# Patient Record
Sex: FEMALE | Race: ASIAN | NOT HISPANIC OR LATINO | Employment: OTHER | URBAN - METROPOLITAN AREA
[De-identification: names, ages, dates, MRNs, and addresses within clinical notes are randomized per-mention and may not be internally consistent; named-entity substitution may affect disease eponyms.]

---

## 2019-05-15 ENCOUNTER — CONVERSION ENCOUNTER (OUTPATIENT)
Dept: GASTROENTEROLOGY | Facility: CLINIC | Age: 54
End: 2019-05-15
Attending: INTERNAL MEDICINE

## 2019-06-12 ENCOUNTER — HOSPITAL ENCOUNTER (OUTPATIENT)
Dept: GASTROENTEROLOGY | Facility: HOSPITAL | Age: 54
Setting detail: HOSPITAL OUTPATIENT SURGERY
Discharge: HOME | End: 2019-06-12
Attending: INTERNAL MEDICINE

## 2020-02-05 ENCOUNTER — OFFICE VISIT CONVERTED (OUTPATIENT)
Dept: ORTHOPEDIC SURGERY | Facility: CLINIC | Age: 55
End: 2020-02-05
Attending: ORTHOPAEDIC SURGERY

## 2020-02-20 ENCOUNTER — OFFICE VISIT CONVERTED (OUTPATIENT)
Dept: GASTROENTEROLOGY | Facility: CLINIC | Age: 55
End: 2020-02-20
Attending: NURSE PRACTITIONER

## 2020-03-04 ENCOUNTER — HOSPITAL ENCOUNTER (OUTPATIENT)
Dept: GASTROENTEROLOGY | Facility: HOSPITAL | Age: 55
Setting detail: HOSPITAL OUTPATIENT SURGERY
Discharge: HOME | End: 2020-03-04
Attending: INTERNAL MEDICINE

## 2020-08-26 ENCOUNTER — OFFICE VISIT CONVERTED (OUTPATIENT)
Dept: ORTHOPEDIC SURGERY | Facility: CLINIC | Age: 55
End: 2020-08-26
Attending: ORTHOPAEDIC SURGERY

## 2021-01-15 ENCOUNTER — CONVERSION ENCOUNTER (OUTPATIENT)
Dept: ORTHOPEDIC SURGERY | Facility: CLINIC | Age: 56
End: 2021-01-15

## 2021-01-15 ENCOUNTER — OFFICE VISIT CONVERTED (OUTPATIENT)
Dept: ORTHOPEDIC SURGERY | Facility: CLINIC | Age: 56
End: 2021-01-15
Attending: ORTHOPAEDIC SURGERY

## 2021-02-08 ENCOUNTER — OFFICE VISIT CONVERTED (OUTPATIENT)
Dept: ORTHOPEDIC SURGERY | Facility: CLINIC | Age: 56
End: 2021-02-08
Attending: ORTHOPAEDIC SURGERY

## 2021-05-10 NOTE — H&P
History and Physical      Patient Name: Kahlil Elliott   Patient ID: [id number]   Sex: Female   YOB: 1965    Primary Care Provider: Nasrin HAAS   Referring Provider: Nasrin HAAS    Visit Date: February 8, 2021    Provider: Davis Lowe MD   Location: [facility]   Location Address: [address] [place] [address]  [id number]   Location Phone: [phone]          Chief Complaint  · Left shoulder pain       History Of Present Illness  Kahlil Elliott is a 55 year old female who presents today to [facility].      Patient presents today for an evaluation of left shoulder pain. Patient states she has been having shoulder pain for over a year. She states pain starts on her shoulder and radiates down her upper arm. She denies having any treatments for her left shoulder. She has some days where her shoulder pain is worse. Today her shoulder pain is not as bad. She denies any trauma or injury to her left shoulder.       Past Medical History  Arthritis; High blood pressure; Hypertension; Seasonal allergies         Past Surgical History  Back; Back surgery; breast reduction; Bunionectomy; Colonoscopy         Medication List  diclofenac sodium 75 mg oral tablet,delayed release (DR/EC); Gentak 0.3 % (3 mg/gram) ophthalmic (eye) ointment; Miralax 17 gram/dose oral powder; Norvasc 2.5 mg oral tablet; Vitamin D2 50,000 unit oral capsule; Zyrtec 10 mg oral capsule         Allergy List  NO KNOWN DRUG ALLERGIES       Allergies Reconciled  Family Medical History  No family history of colorectal cancer; Family history of Arthritis         Social History  Alcohol (Current some day); Alcohol Use (Current some day); lives with spouse; .; Recreational Drug Use (Never); Tobacco (Never); Unemployed.         Review of Systems  · Constitutional  o Denies  o : fever, chills, weight loss  · Cardiovascular  o Denies  o : chest pain, shortness of  "breath  · Gastrointestinal  o Denies  o : liver disease, heartburn, nausea, blood in stools  · Genitourinary  o Denies  o : painful urination, blood in urine  · Integument  o Denies  o : rash, itching  · Neurologic  o Denies  o : headache, weakness, loss of consciousness  · Musculoskeletal  o Denies  o : painful, swollen joints  · Psychiatric  o Denies  o : drug/alcohol addiction, anxiety, depression      Vitals  Date Time BP Position Site L\R Cuff Size HR RR TEMP (F) WT  HT  BMI kg/m2 BSA m2 O2 Sat FR L/min FiO2 HC       02/08/2021 09:43 AM      74 - R   151lbs 2oz 5'  4\" 25.94 1.76 98 %            Physical Examination  · Constitutional  o Appearance  o : well developed, well-nourished, no obvious deformities present  · Head and Face  o Head  o :   § Inspection  § : normocephalic  o Face  o :   § Inspection  § : no facial lesions  · Eyes  o Conjunctivae  o : conjunctivae normal  o Sclerae  o : sclerae white  · Ears, Nose, Mouth and Throat  o Ears  o :   § External Ears  § : appearance within normal limits  § Hearing  § : intact  o Nose  o :   § External Nose  § : appearance normal  · Neck  o Inspection/Palpation  o : normal appearance  o Range of Motion  o : full range of motion  · Respiratory  o Respiratory Effort  o : breathing unlabored  o Inspection of Chest  o : normal appearance  o Auscultation of Lungs  o : no audible wheezing or rales  · Cardiovascular  o Heart  o : regular rate  · Gastrointestinal  o Abdominal Examination  o : soft and non-tender  · Skin and Subcutaneous Tissue  o General Inspection  o : intact, no rashes  · Psychiatric  o General  o : Alert and oriented x3  o Judgement and Insight  o : judgment and insight intact  o Mood and Affect  o : mood normal, affect appropriate  · Left Shoulder  o Inspection  o : Sensation grossly intact. Neurovascular intact. Skin intact. Pulses are pleasant. Good tone of deltoid, biceps, triceps, wrist extensors, and wrist flexors. Tender AC joint. Tender biceps " tendon. Full forward flexion. Mild pain with empty can testing. 4 out of 5 RTC strength. Full cross body adduction. Full abduction. Full IR and ER. No pain with Shah. No pain with Neer's. Pain with drop arm testing.   · Injection Note/Aspiration Note  o Site  o : left shoulder  o Procedure  o : Procedure: After educating the patient, patient gave consent for procedure. After using Chloraprep, the joint space was injected. The patient tolerated the procedure well.  o Medication  o : 80 mg of DepoMedrol with 9cc of 1% Lidocaine  · In Office Procedures  o View  o : AP/LATERAL  o Site  o : left, shoulder  o Indication  o : Left shoulder pain   o Study  o : X-rays ordered, taken in the office, and reviewed today.  o Xray  o : No significant degenerative changes of the left shoulder. No fracture or dislocation.   o Comparative Data  o : No comparative data found          Assessment  · Left shoulder pain, unspecified chronicity     719.41/M25.512  · Left Shoulder Rotator Cuff Tendinitis     726.90/M77.9      Plan  · Orders  o Depo-Medrol injection 80mg () - - 02/08/2021   Lot [id number] Exp 01 2022 [employer] Administered by RASHEED DAVID MD   o Shoulder Intra-articular Injection without US Guidance [facility] (87896) - - 02/08/2021   Lot [id number] Exp 03 01 2022 [employer] Administered by RASHEED DAVID MD   o Scapula (Left) [facility] Preferred View (60226-ZL) - 719.41/M25.512 - 02/08/2021  · Medications  o Medications have been Reconciled  o Transition of Care or Provider Policy  · Instructions  o Dr. David saw and examined the patient and agrees with plan.   o X-rays reviewed by Dr. David.  o Reviewed the patient's Past Medical, Social, and Family history as well as the ROS at today's visit, no changes.  o Call or return if worsening symptoms.  o Follow up in May.   o This note was transcribed by Freya Pollack. daquan  o Discussed diagnosis and treatment options with the patient. Patient opted for an injection and tolerated it well. She is  visiting her daughter in [place] and won't be back until May. If pain doesn't improve or worsens she will consider an MRI when she returns.             Electronically Signed by: Freya Pollack-, Other -Author on February 8, 2021 11:08:37 AM  Electronically Co-signed by: Davis Lowe MD -Reviewer on February 15, 2021 04:55:12 PM

## 2021-05-13 NOTE — PROGRESS NOTES
Progress Note      Patient Name: Kahlil Elliott   Patient ID: [id number]   Sex: Female   YOB: 1965    Primary Care Provider: Nasrin HAAS   Referring Provider: Nasrin HAAS    Visit Date: August 26, 2020    Provider: Davis Lowe MD   Location: [facility]   Location Address: [address] [place] [address]  [id number]   Location Phone: [phone]          Chief Complaint  · Bilateral Elbow Pain      History Of Present Illness  Kahlil Elliott is a 55 year old female who presents today to [facility]. She is here for bilateral epicondylitis, lateral epicondylitis. She is requesting injections.       Past Medical History  Arthritis; High blood pressure         Past Surgical History  Back surgery; breast reduction; Bunionectomy; Colonoscopy         Medication List  diclofenac sodium 75 mg oral tablet,delayed release (DR/EC); Gentak 0.3 % (3 mg/gram) ophthalmic (eye) ointment; Miralax 17 gram/dose oral powder; Norvasc 2.5 mg oral tablet; Vitamin D2 50,000 unit oral capsule; Zyrtec 10 mg oral capsule         Allergy List  NO KNOWN DRUG ALLERGIES         Family Medical History  No family history of colorectal cancer; Family history of Arthritis         Social History  Alcohol (Current some day); lives with spouse; .; Recreational Drug Use (Never); Tobacco (Never); Unemployed.         Review of Systems  · Constitutional  o Denies  o : fever, chills, weight loss  · Cardiovascular  o Denies  o : chest pain, shortness of breath  · Gastrointestinal  o Admits  o : heartburn  o Denies  o : liver disease, nausea, blood in stools  · Genitourinary  o Denies  o : painful urination, blood in urine  · Integument  o Admits  o : itching  o Denies  o : rash  · Neurologic  o Denies  o : headache, weakness, loss of consciousness  · Musculoskeletal  o Admits  o : painful, swollen joints  · Psychiatric  o Denies  o : drug/alcohol addiction, anxiety, depression      Vitals  Date  "Time BP Position Site L\R Cuff Size HR RR TEMP (F) WT  HT  BMI kg/m2 BSA m2 O2 Sat        08/26/2020 07:37 AM         138lbs 0oz 5'  4\" 23.69 1.68           Physical Examination  · Constitutional  o Appearance  o : well developed, well-nourished, no obvious deformities present  · Head and Face  o Head  o :   § Inspection  § : normocephalic  o Face  o :   § Inspection  § : no facial lesions  · Eyes  o Conjunctivae  o : conjunctivae normal  o Sclerae  o : sclerae white  · Ears, Nose, Mouth and Throat  o Ears  o :   § External Ears  § : appearance within normal limits  § Hearing  § : intact  o Nose  o :   § External Nose  § : appearance normal  · Neck  o Inspection/Palpation  o : normal appearance  o Range of Motion  o : full range of motion  · Respiratory  o Respiratory Effort  o : breathing unlabored  o Inspection of Chest  o : normal appearance  o Auscultation of Lungs  o : no audible wheezing or rales  · Cardiovascular  o Heart  o : regular rate  · Gastrointestinal  o Abdominal Examination  o : soft and non-tender  · Skin and Subcutaneous Tissue  o General Inspection  o : intact, no rashes  · Psychiatric  o General  o : Alert and oriented x3  o Judgement and Insight  o : judgment and insight intact  o Mood and Affect  o : mood normal, affect appropriate  · Extremities  o Extremities  o : BILATERAL ELBOW: Appearance of elbows are normal. No skin discoloration, atrophy, or swelling. Full elbow range of motion. Tender lateral elbow. Non-tender medial elbow. Pain with wrist extension. Good tone of deltoid, biceps, triceps, wrist extensors, and wrist flexors. Neurovascularly intact. Sensation grossly intact.  · Injection Note/Aspiration Note  o Site  o : bilateral elbows   o Procedure  o : Procedure: After educating the patient, patient gave consent for procedure. After using Chloraprep, the joint space was injected. The patient tolerated the procedure well.   o Medication  o : 80 mg of DepoMedrol with 1cc of 1% " Lidocaine          Assessment  · Pain of both elbows       Pain in right elbow     719.42/M25.521  Pain in left elbow     719.42/M25.522  · Bilateral epicondylitis, lateral     726.32/M77.10      Plan  · Orders  o 2 - Depo-Medrol injection 80mg () - - 08/26/2020   Lot [id number] Exp 07 2021 [employer] Administered by RASHEED Lowe MD  o 2 - Elbow-Lat Single Tendon (Injection) (64677) - - 08/26/2020   Lot [id number] Exp 08 01 2021 [employer] Administered by RASHEED Lowe MD  · Medications  o Medications have been Reconciled  o Transition of Care or Provider Policy  · Instructions  o Reviewed the patient's Past Medical, Social, and Family history as well as the ROS at today's visit, no changes.  o Call or return if worsening symptoms.  o This note is transcribed by Madeleine butt  o I injected both elbows and she tolerated it well            Electronically Signed by: Madeleine Kaplan, -Author on August 26, 2020 03:51:07 PM  Electronically Co-signed by: Davis Lowe MD -Reviewer on August 26, 2020 05:59:26 PM

## 2021-05-14 VITALS — OXYGEN SATURATION: 98 % | WEIGHT: 151.12 LBS | HEART RATE: 74 BPM | HEIGHT: 64 IN | BODY MASS INDEX: 25.8 KG/M2

## 2021-05-14 VITALS — HEART RATE: 76 BPM | BODY MASS INDEX: 25.44 KG/M2 | WEIGHT: 149 LBS | HEIGHT: 64 IN | OXYGEN SATURATION: 98 %

## 2021-05-14 VITALS — BODY MASS INDEX: 23.56 KG/M2 | HEIGHT: 64 IN | WEIGHT: 138 LBS

## 2021-05-14 NOTE — PROGRESS NOTES
Progress Note      Patient Name: Kahlil Elliott   Patient ID: [id number]   Sex: Female   YOB: 1965    Primary Care Provider: Nasrin HAAS   Referring Provider: Nasrin HAAS    Visit Date: January 15, 2021    Provider: Davis Lowe MD   Location: [facility]   Location Address: [address] [place] [address]  [id number]   Location Phone: [phone]          Chief Complaint  · Left Elbow Pain      History Of Present Illness  Kahlil Elliott is a 55 year old female who presents today to [facility].      Patient presents today with a follow-up of left elbow pain. Patient has a history of lateral epicondylitis of bilateral elbows that she has been treating conservatively with injections. Patient states injections do give her relief of pain and is requesting to receive another injection. She states that her right elbow pain has been doing well and has not been causing her any troubles.       Past Medical History  Arthritis; High blood pressure         Past Surgical History  Back surgery; breast reduction; Bunionectomy; Colonoscopy         Medication List  diclofenac sodium 75 mg oral tablet,delayed release (DR/EC); Gentak 0.3 % (3 mg/gram) ophthalmic (eye) ointment; Miralax 17 gram/dose oral powder; Norvasc 2.5 mg oral tablet; Vitamin D2 50,000 unit oral capsule; Zyrtec 10 mg oral capsule         Allergy List  NO KNOWN DRUG ALLERGIES       Allergies Reconciled  Family Medical History  No family history of colorectal cancer; Family history of Arthritis         Social History  Alcohol (Current some day); lives with spouse; .; Recreational Drug Use (Never); Tobacco (Never); Unemployed.         Review of Systems  · Constitutional  o Denies  o : fever, chills, weight loss  · Cardiovascular  o Denies  o : chest pain, shortness of breath  · Gastrointestinal  o Denies  o : liver disease, heartburn, nausea, blood in stools  · Genitourinary  o Denies  o : painful  "urination, blood in urine  · Integument  o Denies  o : rash, itching  · Neurologic  o Denies  o : headache, weakness, loss of consciousness  · Musculoskeletal  o Denies  o : painful, swollen joints  · Psychiatric  o Denies  o : drug/alcohol addiction, anxiety, depression      Vitals  Date Time BP Position Site L\R Cuff Size HR RR TEMP (F) WT  HT  BMI kg/m2 BSA m2 O2 Sat FR L/min FiO2        01/15/2021 03:14 PM      76 - R   149lbs 0oz 5'  4\" 25.58 1.75 98 %            Physical Examination  · Constitutional  o Appearance  o : well developed, well-nourished, no obvious deformities present  · Head and Face  o Head  o :   § Inspection  § : normocephalic  o Face  o :   § Inspection  § : no facial lesions  · Eyes  o Conjunctivae  o : conjunctivae normal  o Sclerae  o : sclerae white  · Ears, Nose, Mouth and Throat  o Ears  o :   § External Ears  § : appearance within normal limits  § Hearing  § : intact  o Nose  o :   § External Nose  § : appearance normal  · Neck  o Inspection/Palpation  o : normal appearance  o Range of Motion  o : full range of motion  · Respiratory  o Respiratory Effort  o : breathing unlabored  o Inspection of Chest  o : normal appearance  o Auscultation of Lungs  o : no audible wheezing or rales  · Cardiovascular  o Heart  o : regular rate  · Gastrointestinal  o Abdominal Examination  o : soft and non-tender  · Skin and Subcutaneous Tissue  o General Inspection  o : intact, no rashes  · Psychiatric  o General  o : Alert and oriented x3  o Judgement and Insight  o : judgment and insight intact  o Mood and Affect  o : mood normal, affect appropriate  · Left Elbow  o Inspection  o : Sensation grossly intact. Neurovascular intact. Skin intact. Tender lateral elbow. No swelling, skin discoloration or atrophy. Full flexion and extension of elbow. Pain with resisted wrist extension. Good tone of deltoid, biceps, triceps, wrist extensors, and wrist flexors.  · Injection Note/Aspiration Note  o Site  o : " left elbow  o Procedure  o : Procedure: After educating the patient, patient gave consent for procedure. After using Chloraprep, the joint space was injected. The patient tolerated the procedure well.  o Medication  o : 80 mg of DepoMedrol with 1cc of 1% Lidocaine          Assessment  · Left elbow pain     719.42/M25.522  · Lateral epicondylitis of left elbow     726.32/M77.12      Plan  · Orders  o Depo-Medrol injection 80mg () - - 01/15/2021   Lot [id number] Exo 01 2022 [employer] Administered by MARYANA DAVID MD  o Elbow-Lat Single Tendon (Injection) (41183) - - 01/15/2021   Lot [id number] Exp 03 01 2022 [employer] Administered by MARYANA DAVID MD  · Medications  o Medications have been Reconciled  o Transition of Care or Provider Policy  · Instructions  o Dr. David saw and examined the patient and agrees with plan.   o Reviewed the patient's Past Medical, Social, and Family history as well as the ROS at today's visit, no changes.  o Call or return if worsening symptoms.  o Exercise handout given.  o Follow Up in 3 weeks.  o This note was transcribed by Freya Pollack. daquan  o Discussed diagnosis and treatment options with the patient. Patient received a left elbow injection and tolerated this well. Patient given some at home exercises to work on for tennis elbow.            Electronically Signed by: Freya Pollack-, Other -Author on January 18, 2021 09:06:11 AM  Electronically Co-signed by: Maryana David MD -Reviewer on January 19, 2021 07:40:47 PM

## 2021-05-15 VITALS
BODY MASS INDEX: 23.62 KG/M2 | DIASTOLIC BLOOD PRESSURE: 82 MMHG | SYSTOLIC BLOOD PRESSURE: 118 MMHG | WEIGHT: 138.37 LBS | HEIGHT: 64 IN

## 2021-05-15 VITALS — OXYGEN SATURATION: 97 % | HEIGHT: 64 IN | WEIGHT: 137.37 LBS | HEART RATE: 74 BPM | BODY MASS INDEX: 23.45 KG/M2

## 2022-11-10 ENCOUNTER — OFFICE VISIT (OUTPATIENT)
Dept: INTERNAL MEDICINE | Facility: CLINIC | Age: 57
End: 2022-11-10

## 2022-11-10 VITALS
TEMPERATURE: 98.3 F | HEIGHT: 64 IN | BODY MASS INDEX: 24.96 KG/M2 | DIASTOLIC BLOOD PRESSURE: 86 MMHG | OXYGEN SATURATION: 98 % | HEART RATE: 74 BPM | SYSTOLIC BLOOD PRESSURE: 124 MMHG | WEIGHT: 146.2 LBS

## 2022-11-10 DIAGNOSIS — Z23 ENCOUNTER FOR IMMUNIZATION: ICD-10-CM

## 2022-11-10 DIAGNOSIS — R73.9 HYPERGLYCEMIA: ICD-10-CM

## 2022-11-10 DIAGNOSIS — Z11.59 NEED FOR HEPATITIS C SCREENING TEST: ICD-10-CM

## 2022-11-10 DIAGNOSIS — E78.5 HYPERLIPIDEMIA, UNSPECIFIED HYPERLIPIDEMIA TYPE: ICD-10-CM

## 2022-11-10 DIAGNOSIS — Z00.00 ANNUAL PHYSICAL EXAM: Primary | ICD-10-CM

## 2022-11-10 DIAGNOSIS — M19.90 ARTHRITIS: ICD-10-CM

## 2022-11-10 DIAGNOSIS — I10 ESSENTIAL HYPERTENSION: ICD-10-CM

## 2022-11-10 DIAGNOSIS — Z12.11 SCREEN FOR COLON CANCER: ICD-10-CM

## 2022-11-10 DIAGNOSIS — E66.3 OVERWEIGHT (BMI 25.0-29.9): ICD-10-CM

## 2022-11-10 DIAGNOSIS — Z76.89 ESTABLISHING CARE WITH NEW DOCTOR, ENCOUNTER FOR: ICD-10-CM

## 2022-11-10 DIAGNOSIS — Z85.3 HISTORY OF BREAST CANCER: ICD-10-CM

## 2022-11-10 PROBLEM — M25.522 ARTHRALGIA OF LEFT ELBOW: Status: ACTIVE | Noted: 2018-11-13

## 2022-11-10 PROBLEM — M25.519 SHOULDER JOINT PAIN: Status: ACTIVE | Noted: 2018-11-13

## 2022-11-10 PROBLEM — M51.36 DEGENERATION OF LUMBAR INTERVERTEBRAL DISC: Status: ACTIVE | Noted: 2018-11-13

## 2022-11-10 PROBLEM — M25.571 BILATERAL ANKLE JOINT PAIN: Status: ACTIVE | Noted: 2022-11-10

## 2022-11-10 PROBLEM — M25.572 BILATERAL ANKLE JOINT PAIN: Status: ACTIVE | Noted: 2022-11-10

## 2022-11-10 PROBLEM — G56.02 CARPAL TUNNEL SYNDROME OF LEFT WRIST: Status: ACTIVE | Noted: 2018-11-13

## 2022-11-10 PROBLEM — J30.2 SEASONAL ALLERGIC RHINITIS: Status: ACTIVE | Noted: 2022-11-10

## 2022-11-10 PROBLEM — M79.673 FOOT PAIN: Status: ACTIVE | Noted: 2018-11-13

## 2022-11-10 LAB
ALBUMIN SERPL-MCNC: 4.5 G/DL (ref 3.5–5.2)
ALBUMIN/GLOB SERPL: 1.4 G/DL
ALP SERPL-CCNC: 105 U/L (ref 39–117)
ALT SERPL W P-5'-P-CCNC: 12 U/L (ref 1–33)
ANION GAP SERPL CALCULATED.3IONS-SCNC: 11.1 MMOL/L (ref 5–15)
AST SERPL-CCNC: 18 U/L (ref 1–32)
BASOPHILS # BLD AUTO: 0.07 10*3/MM3 (ref 0–0.2)
BASOPHILS NFR BLD AUTO: 0.9 % (ref 0–1.5)
BILIRUB SERPL-MCNC: 0.3 MG/DL (ref 0–1.2)
BUN SERPL-MCNC: 12 MG/DL (ref 6–20)
BUN/CREAT SERPL: 16.9 (ref 7–25)
CALCIUM SPEC-SCNC: 9.6 MG/DL (ref 8.6–10.5)
CHLORIDE SERPL-SCNC: 105 MMOL/L (ref 98–107)
CHOLEST SERPL-MCNC: 192 MG/DL (ref 0–200)
CO2 SERPL-SCNC: 23.9 MMOL/L (ref 22–29)
CREAT SERPL-MCNC: 0.71 MG/DL (ref 0.57–1)
DEPRECATED RDW RBC AUTO: 41.3 FL (ref 37–54)
EGFRCR SERPLBLD CKD-EPI 2021: 98.1 ML/MIN/1.73
EOSINOPHIL # BLD AUTO: 0.16 10*3/MM3 (ref 0–0.4)
EOSINOPHIL NFR BLD AUTO: 2.2 % (ref 0.3–6.2)
ERYTHROCYTE [DISTWIDTH] IN BLOOD BY AUTOMATED COUNT: 12.4 % (ref 12.3–15.4)
GLOBULIN UR ELPH-MCNC: 3.2 GM/DL
GLUCOSE SERPL-MCNC: 75 MG/DL (ref 65–99)
HBA1C MFR BLD: 5.7 % (ref 4.8–5.6)
HCT VFR BLD AUTO: 46.9 % (ref 34–46.6)
HCV AB SER DONR QL: NORMAL
HDLC SERPL-MCNC: 39 MG/DL (ref 40–60)
HGB BLD-MCNC: 15.4 G/DL (ref 12–15.9)
IMM GRANULOCYTES # BLD AUTO: 0.02 10*3/MM3 (ref 0–0.05)
IMM GRANULOCYTES NFR BLD AUTO: 0.3 % (ref 0–0.5)
LDLC SERPL CALC-MCNC: 104 MG/DL (ref 0–100)
LDLC/HDLC SERPL: 2.47 {RATIO}
LYMPHOCYTES # BLD AUTO: 2.19 10*3/MM3 (ref 0.7–3.1)
LYMPHOCYTES NFR BLD AUTO: 29.7 % (ref 19.6–45.3)
MCH RBC QN AUTO: 29.7 PG (ref 26.6–33)
MCHC RBC AUTO-ENTMCNC: 32.8 G/DL (ref 31.5–35.7)
MCV RBC AUTO: 90.5 FL (ref 79–97)
MONOCYTES # BLD AUTO: 0.45 10*3/MM3 (ref 0.1–0.9)
MONOCYTES NFR BLD AUTO: 6.1 % (ref 5–12)
NEUTROPHILS NFR BLD AUTO: 4.48 10*3/MM3 (ref 1.7–7)
NEUTROPHILS NFR BLD AUTO: 60.8 % (ref 42.7–76)
NRBC BLD AUTO-RTO: 0.1 /100 WBC (ref 0–0.2)
PLATELET # BLD AUTO: 315 10*3/MM3 (ref 140–450)
PMV BLD AUTO: 11.2 FL (ref 6–12)
POTASSIUM SERPL-SCNC: 4.3 MMOL/L (ref 3.5–5.2)
PROT SERPL-MCNC: 7.7 G/DL (ref 6–8.5)
RBC # BLD AUTO: 5.18 10*6/MM3 (ref 3.77–5.28)
SODIUM SERPL-SCNC: 140 MMOL/L (ref 136–145)
TRIGL SERPL-MCNC: 284 MG/DL (ref 0–150)
TSH SERPL DL<=0.05 MIU/L-ACNC: 2.67 UIU/ML (ref 0.27–4.2)
VLDLC SERPL-MCNC: 49 MG/DL (ref 5–40)
WBC NRBC COR # BLD: 7.37 10*3/MM3 (ref 3.4–10.8)

## 2022-11-10 PROCEDURE — 80053 COMPREHEN METABOLIC PANEL: CPT | Performed by: STUDENT IN AN ORGANIZED HEALTH CARE EDUCATION/TRAINING PROGRAM

## 2022-11-10 PROCEDURE — 3008F BODY MASS INDEX DOCD: CPT | Performed by: STUDENT IN AN ORGANIZED HEALTH CARE EDUCATION/TRAINING PROGRAM

## 2022-11-10 PROCEDURE — 86803 HEPATITIS C AB TEST: CPT | Performed by: STUDENT IN AN ORGANIZED HEALTH CARE EDUCATION/TRAINING PROGRAM

## 2022-11-10 PROCEDURE — G0008 ADMIN INFLUENZA VIRUS VAC: HCPCS | Performed by: STUDENT IN AN ORGANIZED HEALTH CARE EDUCATION/TRAINING PROGRAM

## 2022-11-10 PROCEDURE — 90686 IIV4 VACC NO PRSV 0.5 ML IM: CPT | Performed by: STUDENT IN AN ORGANIZED HEALTH CARE EDUCATION/TRAINING PROGRAM

## 2022-11-10 PROCEDURE — 2014F MENTAL STATUS ASSESS: CPT | Performed by: STUDENT IN AN ORGANIZED HEALTH CARE EDUCATION/TRAINING PROGRAM

## 2022-11-10 PROCEDURE — 36415 COLL VENOUS BLD VENIPUNCTURE: CPT | Performed by: STUDENT IN AN ORGANIZED HEALTH CARE EDUCATION/TRAINING PROGRAM

## 2022-11-10 PROCEDURE — 84443 ASSAY THYROID STIM HORMONE: CPT | Performed by: STUDENT IN AN ORGANIZED HEALTH CARE EDUCATION/TRAINING PROGRAM

## 2022-11-10 PROCEDURE — 80061 LIPID PANEL: CPT | Performed by: STUDENT IN AN ORGANIZED HEALTH CARE EDUCATION/TRAINING PROGRAM

## 2022-11-10 PROCEDURE — 85025 COMPLETE CBC W/AUTO DIFF WBC: CPT | Performed by: STUDENT IN AN ORGANIZED HEALTH CARE EDUCATION/TRAINING PROGRAM

## 2022-11-10 PROCEDURE — 99386 PREV VISIT NEW AGE 40-64: CPT | Performed by: STUDENT IN AN ORGANIZED HEALTH CARE EDUCATION/TRAINING PROGRAM

## 2022-11-10 PROCEDURE — 83036 HEMOGLOBIN GLYCOSYLATED A1C: CPT | Performed by: STUDENT IN AN ORGANIZED HEALTH CARE EDUCATION/TRAINING PROGRAM

## 2022-11-10 RX ORDER — HYDROXYZINE HYDROCHLORIDE 25 MG/1
TABLET, FILM COATED ORAL
COMMUNITY

## 2022-11-10 RX ORDER — OMALIZUMAB 202.5 MG/1.4ML
INJECTION, SOLUTION SUBCUTANEOUS
COMMUNITY

## 2022-11-10 RX ORDER — EZETIMIBE 10 MG/1
TABLET ORAL
COMMUNITY
End: 2022-11-10 | Stop reason: SDUPTHER

## 2022-11-10 RX ORDER — EZETIMIBE 10 MG/1
10 TABLET ORAL DAILY
Qty: 90 TABLET | Refills: 3 | Status: SHIPPED | OUTPATIENT
Start: 2022-11-10

## 2022-11-10 RX ORDER — TAMOXIFEN CITRATE 20 MG/1
20 TABLET ORAL DAILY
COMMUNITY
Start: 2022-09-12 | End: 2022-12-08

## 2022-11-10 RX ORDER — GENTAMICIN SULFATE 3 MG/G
OINTMENT OPHTHALMIC
COMMUNITY
End: 2023-01-13

## 2022-11-10 RX ORDER — CETIRIZINE HYDROCHLORIDE 10 MG/1
TABLET ORAL
COMMUNITY

## 2022-11-10 RX ORDER — AMLODIPINE BESYLATE 10 MG/1
TABLET ORAL
COMMUNITY
End: 2023-04-06 | Stop reason: SDUPTHER

## 2022-11-10 NOTE — PROGRESS NOTES
Chief Complaint  Establish Care (New pt here to establish care, pt would like to have med refills as well as get scheduled for Colonoscopy.)    Subjective          Kahlil Elliott presents to [facility]  History of Present Illness    Previous PCP: Savannah Valles in [place]  Specialists: [facility]    Breast Cancer:    Diagnosed 2017 in left breast as the result of breast reduction surgery.  Was placed on 5 years tamoxifen, which she completed September of this year.  Follows with Dr. Penaloza at [facility].  Next appointment, 12/14/2022.    No known history of other types of cancer.    Colonoscopy 3 years ago.  Found polyps, and Dr. Mayes recommended repeat scope in 3 years (which would be this year).    Plan to get mammogram next month through [facility].    Allergic rhinitis:    Receives Xolaris injections through the [facility]    Arthritis:    Present in hands and knees, principally.    Misc:    No previous history of heart attack or stroke.  No other diagnosis of cancer besides breast cancer.        Current Outpatient Medications   Medication Instructions   • amLODIPine (NORVASC) 10 MG tablet amlodipine 10 mg tablet   TAKE 1 TABLET BY MOUTH DAILY   • cetirizine (zyrTEC) 10 MG tablet cetirizine 10 mg tablet   • ezetimibe (ZETIA) 10 mg, Oral, Daily   • gentamicin (Gentak) 0.3 % ophthalmic ointment No dose, route, or frequency recorded.   • hydrOXYzine (ATARAX) 25 MG tablet hydroxyzine HCl 25 mg tablet   • omalizumab (Xolair) 150 MG injection Xolair 150 mg subcutaneous solution   • tamoxifen (NOLVADEX) 20 mg, Oral, Daily       The following portions of the patient's history were reviewed and updated as appropriate: allergies, current medications, past family history, past medical history, past social history, past surgical history, and problem list.    Objective   Vital Signs:   /86 (BP Location: Left arm, Patient Position: Sitting, Cuff  "Size: Adult)   Pulse 74   Temp 98.3 °F (36.8 °C) (Temporal)   Ht 162.6 cm (64\")   Wt 66.3 kg (146 lb 3.2 oz)   SpO2 98%   BMI 25.10 kg/m²     Wt Readings from Last 3 Encounters:   11/10/22 66.3 kg (146 lb 3.2 oz)   02/08/21 68.5 kg (151 lb 2 oz)   01/15/21 67.6 kg (149 lb)     BP Readings from Last 3 Encounters:   11/10/22 124/86   02/20/20 118/82     Physical Exam  Vitals and nursing note reviewed.   Constitutional:       General: She is not in acute distress.     Appearance: Normal appearance. She is well-developed. She is not ill-appearing, toxic-appearing or diaphoretic.   HENT:      Head: Normocephalic and atraumatic.      Right Ear: Tympanic membrane, ear canal and external ear normal.      Left Ear: Tympanic membrane, ear canal and external ear normal.      Mouth/Throat:      Mouth: Mucous membranes are moist.      Pharynx: Oropharynx is clear. No oropharyngeal exudate.   Eyes:      Conjunctiva/sclera: Conjunctivae normal.   Cardiovascular:      Rate and Rhythm: Normal rate and regular rhythm.      Pulses: Normal pulses.      Heart sounds: Normal heart sounds. No murmur heard.    No friction rub. No gallop.   Pulmonary:      Effort: Pulmonary effort is normal.      Breath sounds: Normal breath sounds. No wheezing or rhonchi.   Abdominal:      General: Bowel sounds are normal. There is no distension.      Palpations: Abdomen is soft. There is no mass.      Tenderness: There is no abdominal tenderness.   Musculoskeletal:      Right lower leg: No edema.      Left lower leg: No edema.   Skin:     General: Skin is warm and dry.   Neurological:      Mental Status: She is alert. Mental status is at baseline.   Psychiatric:         Mood and Affect: Mood and affect normal.         Behavior: Behavior normal.         Thought Content: Thought content normal.         Judgment: Judgment normal.            Result Review :   The following data was reviewed by: Jarad Landeros MD on 11/10/2022:           No results " found for: SARSANTIGEN, COVID19, RAPFLUA, RAPFLUB, FLUAAG, FLUABDAG, FLUABDAG, FLU, FLU, FLUBAG, RAPSCRN, STREPAAG, RSV, POCPREGUR, MONOSPOT, INR, LEADCAPBLD, POCLEAD, BILIRUBINUR    Procedures        Assessment and Plan    Diagnoses and all orders for this visit:    1. Annual physical exam (Primary)  -     Comprehensive Metabolic Panel  -     Hepatitis C Antibody  -     Lipid Panel  -     Hemoglobin A1c  -     Ambulatory Referral For Screening Colonoscopy  -     ezetimibe (ZETIA) 10 MG tablet; Take 1 tablet by mouth Daily.  Dispense: 90 tablet; Refill: 3  -     TSH  -     CBC & Differential  -     FluLaval/Fluzone >6 mos ([id number])    2. Establishing care with new doctor, encounter for    3. Essential hypertension  -     Comprehensive Metabolic Panel    4. Arthritis    5. Need for hepatitis C screening test  -     Hepatitis C Antibody    6. Hyperglycemia  -     Comprehensive Metabolic Panel  -     Hemoglobin A1c    7. Screen for colon cancer  -     Ambulatory Referral For Screening Colonoscopy    8. Hyperlipidemia, unspecified hyperlipidemia type  -     Lipid Panel  -     ezetimibe (ZETIA) 10 MG tablet; Take 1 tablet by mouth Daily.  Dispense: 90 tablet; Refill: 3    9. Encounter for immunization  -     FluLaval/Fluzone >6 mos ([id number])    10. History of breast cancer    11. Overweight (BMI 25.0-29.9)      Health Maintenance:  -nutritional counseling on the components of a heart healthy diet  -exercise counseling, recommending at least 2.5 hours of moderate exercise weekly  -Discussed risks/benefits to vaccination, reviewed components of the vaccine, discussed VIS, discussed informed consent, informed consent obtained. Patient/Parent was allowed to accept or refuse vaccine. Questions answered to satisfactory state of patient/parent. We reviewed typical age appropriate and seasonally appropriate vaccinations. Reviewed immunization history and updated state vaccination form as needed. Patient/Parent was counseled  on the above vaccines.      Medications Discontinued During This Encounter   Medication Reason   • ezetimibe (ZETIA) 10 MG tablet Reorder          Follow Up   Return in about 6 months (around 5/10/2023) for HTN.  Patient was given instructions and counseling regarding her condition or for health maintenance advice. Please see specific information pulled into the AVS if appropriate.       Jarad Landeros MD  11/10/22  18:28 EST

## 2022-11-11 ENCOUNTER — TELEPHONE (OUTPATIENT)
Dept: INTERNAL MEDICINE | Facility: CLINIC | Age: 57
End: 2022-11-11

## 2022-11-11 PROBLEM — R73.03 PREDIABETES: Status: ACTIVE | Noted: 2022-11-11

## 2022-11-11 PROBLEM — E78.2 MIXED HYPERLIPIDEMIA: Status: ACTIVE | Noted: 2022-11-10

## 2022-11-11 NOTE — TELEPHONE ENCOUNTER
----- Message from Jarad Landeros MD sent at 11/11/2022  8:06 AM EST -----  CMP (which measures electrolytes, kidney function and liver enzymes) is within normal limits.    Hemoglobin A1c is in the prediabetic range.  I would recommend regular exercise and a heart healthy diet.  We will monitor for now, as this may worsen over time.    TSH is within normal limits.    CBC is notable for hematocrit that is just above the normal range.  This is a measure of density for red blood cells, and this is not clinically significant.    Hepatitis C screen is negative.    Lipids are notable for elevated triglycerides and LDL cholesterol.  HDL cholesterol is mildly low.  I would like to continue your ezetimibe, and I have asked our  to request further records from your previous PCP.  I would also recommend a heart healthy diet as we discussed in clinic.

## 2022-11-11 NOTE — TELEPHONE ENCOUNTER
1ST FAXED MEDICAL RECORDS REQUEST TO TO [facility]  OFFICE [phone]    ----- Message from Jarad Landeros MD sent at 11/10/2022  6:24 PM EST -----  Please request records from previous pcp, Savannah Valles.  Also please request records from [facility].

## 2022-11-11 NOTE — TELEPHONE ENCOUNTER
I called and spoke with the patient to inform her of Dr Landeros's message regarding her results. The patient understood and has no questions or concerns at this time.

## 2022-11-11 NOTE — TELEPHONE ENCOUNTER
1ST FAXED MEDICAL RECORDS REQUEST TO THE [facility]-[phone]      ----- Message from Jarad Landeros MD sent at 11/10/2022  6:24 PM EST -----  Please request records from previous pcp, Savannah Valles.  Also please request records from [facility].

## 2022-12-08 ENCOUNTER — OFFICE VISIT (OUTPATIENT)
Dept: INTERNAL MEDICINE | Facility: CLINIC | Age: 57
End: 2022-12-08

## 2022-12-08 VITALS
SYSTOLIC BLOOD PRESSURE: 132 MMHG | DIASTOLIC BLOOD PRESSURE: 83 MMHG | TEMPERATURE: 97.9 F | BODY MASS INDEX: 25.81 KG/M2 | HEART RATE: 87 BPM | WEIGHT: 151.2 LBS | HEIGHT: 64 IN | OXYGEN SATURATION: 97 %

## 2022-12-08 DIAGNOSIS — L50.8 CHRONIC URTICARIA: ICD-10-CM

## 2022-12-08 DIAGNOSIS — I10 ESSENTIAL HYPERTENSION: Primary | ICD-10-CM

## 2022-12-08 DIAGNOSIS — J30.9 ALLERGIC RHINITIS, UNSPECIFIED SEASONALITY, UNSPECIFIED TRIGGER: ICD-10-CM

## 2022-12-08 PROCEDURE — 99214 OFFICE O/P EST MOD 30 MIN: CPT | Performed by: STUDENT IN AN ORGANIZED HEALTH CARE EDUCATION/TRAINING PROGRAM

## 2022-12-08 NOTE — PROGRESS NOTES
"Chief Complaint  Immunotherapy (She gets allergy shots [facility] is normally where she goes. /She is going to [facility] for 3 months- she is needing a referral for her to get those there. /[facility] /[address], [address] [place], [address] /Phone number [phone] /Fax number [phone] /Send shot record with referral ) and Medication Problem (Patient state the hydroxyzine isnt working- she is requesting cream )    Subjective          Kahlil Elliott presents to [facility]  History of Present Illness    Traveling soon to [place], [place].  Will stay with family for the next 3 months.  Will go off xolair injections during this period, and needs referral to local clinic for every 2 week allergy shots.    Chronic urticaria is stable.  Requests topical benadryl cream to treat.    Current Outpatient Medications   Medication Instructions   • amLODIPine (NORVASC) 10 MG tablet amlodipine 10 mg tablet   TAKE 1 TABLET BY MOUTH DAILY   • cetirizine (zyrTEC) 10 MG tablet cetirizine 10 mg tablet   • diphenhydrAMINE (BENADRYL) 2 % cream 1 application, Topical, 3 Times Daily PRN   • ezetimibe (ZETIA) 10 mg, Oral, Daily   • gentamicin (Gentak) 0.3 % ophthalmic ointment No dose, route, or frequency recorded.   • hydrOXYzine (ATARAX) 25 MG tablet hydroxyzine HCl 25 mg tablet   • omalizumab (Xolair) 150 MG injection Xolair 150 mg subcutaneous solution   • tamoxifen (NOLVADEX) 20 mg, Oral, Daily       The following portions of the patient's history were reviewed and updated as appropriate: allergies, current medications, past family history, past medical history, past social history, past surgical history, and problem list.    Objective   Vital Signs:   /83 (BP Location: Left arm)   Pulse 87   Temp 97.9 °F (36.6 °C) (Temporal)   Ht 162.6 cm (64\")   Wt 68.6 kg (151 lb 3.2 oz)   SpO2 97%   BMI 25.95 kg/m²     Wt Readings from Last 3 " Encounters:   12/08/22 68.6 kg (151 lb 3.2 oz)   11/10/22 66.3 kg (146 lb 3.2 oz)   02/08/21 68.5 kg (151 lb 2 oz)     BP Readings from Last 3 Encounters:   12/08/22 132/83   11/10/22 124/86   02/20/20 118/82     Physical Exam  Vitals reviewed.   Constitutional:       General: She is not in acute distress.     Appearance: Normal appearance. She is not ill-appearing, toxic-appearing or diaphoretic.   HENT:      Head: Normocephalic and atraumatic.      Right Ear: External ear normal.      Left Ear: External ear normal.   Eyes:      Conjunctiva/sclera: Conjunctivae normal.   Cardiovascular:      Rate and Rhythm: Normal rate and regular rhythm.      Pulses: Normal pulses.      Heart sounds: Normal heart sounds. No murmur heard.    No friction rub. No gallop.   Pulmonary:      Effort: Pulmonary effort is normal. No respiratory distress.      Breath sounds: Normal breath sounds. No stridor. No wheezing, rhonchi or rales.   Chest:      Chest wall: No tenderness.   Abdominal:      General: Abdomen is flat.      Palpations: Abdomen is soft. There is no mass.      Tenderness: There is no abdominal tenderness.   Musculoskeletal:      Right lower leg: No edema.      Left lower leg: No edema.   Skin:     General: Skin is warm and dry.      Comments: Urticaria noted on shins bilaterally   Neurological:      General: No focal deficit present.      Mental Status: She is alert. Mental status is at baseline.   Psychiatric:         Mood and Affect: Mood normal.         Behavior: Behavior normal.         Thought Content: Thought content normal.         Judgment: Judgment normal.        Result Review :   The following data was reviewed by: Jarad Landeros MD on 12/08/2022:  Common labs    Common Labs 11/10/22 11/10/22 11/10/22 11/10/22    1451 1451 1451 1451   Glucose 75      BUN 12      Creatinine 0.71      Sodium 140      Potassium 4.3      Chloride 105      Calcium 9.6      Albumin 4.50      Total Bilirubin 0.3      Alkaline  Phosphatase 105      AST (SGOT) 18      ALT (SGPT) 12      WBC   7.37    Hemoglobin   15.4    Hematocrit   46.9 (A)    Platelets   315    Total Cholesterol  192     Triglycerides  284 (A)     HDL Cholesterol  39 (A)     LDL Cholesterol   104 (A)     Hemoglobin A1C    5.70 (A)   (A) Abnormal value                   No results found for: SARSANTIGEN, COVID19, RAPFLUA, RAPFLUB, FLUAAG, FLUABDAG, FLUABDAG, FLU, FLU, FLUBAG, RAPSCRN, STREPAAG, RSV, POCPREGUR, MONOSPOT, INR, LEADCAPBLD, POCLEAD, BILIRUBINUR    Procedures        Assessment and Plan    Diagnoses and all orders for this visit:    1. Essential hypertension (Primary)  Comments:  -stable  -will continue current regimen    2. Allergic rhinitis, unspecified seasonality, unspecified trigger  -     Ambulatory Referral to ENT (Otolaryngology)    3. Chronic urticaria  Comments:  -will start topical diphenhydramine  -referral placed    Orders:  -     Ambulatory Referral to ENT (Otolaryngology)  -     diphenhydrAMINE (BENADRYL) 2 % cream; Apply 1 application topically to the appropriate area as directed 3 (Three) Times a Day As Needed for Itching.  Dispense: 56.8 g; Refill: 2          There are no discontinued medications.       Follow Up   Return if symptoms worsen or fail to improve.  Patient was given instructions and counseling regarding her condition or for health maintenance advice. Please see specific information pulled into the AVS if appropriate.       Jarad Landeros MD  12/08/22  09:03 EST

## 2022-12-12 ENCOUNTER — TELEPHONE (OUTPATIENT)
Dept: INTERNAL MEDICINE | Facility: CLINIC | Age: 57
End: 2022-12-12

## 2022-12-12 NOTE — TELEPHONE ENCOUNTER
12/13/22 RECEIV[facility] NOTIFICATION THAT [facility] REC[facility] FOR THIS PATIENT/VL    1ST - 12/12/22 [facility]ICAL REC[facility]      ----- Message from Jarad Landeros MD sent at 12/8/2022  9:00 AM EST -----  [facility].

## 2023-01-13 ENCOUNTER — OFFICE VISIT (OUTPATIENT)
Dept: INTERNAL MEDICINE | Facility: CLINIC | Age: 58
End: 2023-01-13
Payer: MEDICARE

## 2023-01-13 VITALS
DIASTOLIC BLOOD PRESSURE: 86 MMHG | HEART RATE: 84 BPM | SYSTOLIC BLOOD PRESSURE: 128 MMHG | HEIGHT: 64 IN | WEIGHT: 151.6 LBS | TEMPERATURE: 98.6 F | BODY MASS INDEX: 25.88 KG/M2 | OXYGEN SATURATION: 96 %

## 2023-01-13 DIAGNOSIS — M51.36 DEGENERATION OF LUMBAR INTERVERTEBRAL DISC: Primary | ICD-10-CM

## 2023-01-13 DIAGNOSIS — G89.29 CHRONIC LEFT-SIDED LOW BACK PAIN WITH LEFT-SIDED SCIATICA: ICD-10-CM

## 2023-01-13 DIAGNOSIS — I10 ESSENTIAL HYPERTENSION: ICD-10-CM

## 2023-01-13 DIAGNOSIS — M54.42 CHRONIC LEFT-SIDED LOW BACK PAIN WITH LEFT-SIDED SCIATICA: ICD-10-CM

## 2023-01-13 PROCEDURE — 99214 OFFICE O/P EST MOD 30 MIN: CPT | Performed by: STUDENT IN AN ORGANIZED HEALTH CARE EDUCATION/TRAINING PROGRAM

## 2023-01-13 NOTE — PROGRESS NOTES
"Chief Complaint  Back Pain (Low back pain)    Subjective          Kahlil Elliott presents to [facility]  History of Present Illness    Had exacerbation of chronic low back pain that started several weeks ago.  Reports peak was January 7th and 8th, when she couldn't get out of bed.    Has had back surgery in 19999, and previously followed at [facility] but hasn't seen them in about a year.    Had an MRI 4-5 years ago showing slipped disk, and she requests referral to pain management as well as repeat MRI.    She has trialed PT in the past and per her report it did not help    She denies urinary issues or saddle anesthesia.    Current Outpatient Medications   Medication Instructions   • amLODIPine (NORVASC) 10 MG tablet amlodipine 10 mg tablet   TAKE 1 TABLET BY MOUTH DAILY   • cetirizine (zyrTEC) 10 MG tablet cetirizine 10 mg tablet   • Cholecalciferol 100 MCG (4000 UT) capsule 4,000 Int'l Units, Oral, Daily   • diphenhydrAMINE (BENADRYL) 2 % cream 1 application, Topical, 3 Times Daily PRN   • ezetimibe (ZETIA) 10 mg, Oral, Daily   • hydrOXYzine (ATARAX) 25 MG tablet hydroxyzine HCl 25 mg tablet   • omalizumab (Xolair) 150 MG injection Xolair 150 mg subcutaneous solution       The following portions of the patient's history were reviewed and updated as appropriate: allergies, current medications, past family history, past medical history, past social history, past surgical history, and problem list.    Objective   Vital Signs:   /86   Pulse 84   Temp 98.6 °F (37 °C) (Temporal)   Ht 162.6 cm (64\")   Wt 68.8 kg (151 lb 9.6 oz)   SpO2 96%   BMI 26.02 kg/m²     Wt Readings from Last 3 Encounters:   01/13/23 68.8 kg (151 lb 9.6 oz)   12/08/22 68.6 kg (151 lb 3.2 oz)   11/10/22 66.3 kg (146 lb 3.2 oz)     BP Readings from Last 3 Encounters:   01/13/23 128/86   12/08/22 132/83   11/10/22 124/86     Physical Exam  Vitals reviewed.   Constitutional:       " General: She is not in acute distress.     Appearance: Normal appearance. She is not ill-appearing, toxic-appearing or diaphoretic.   HENT:      Head: Normocephalic and atraumatic.      Right Ear: External ear normal.      Left Ear: External ear normal.   Eyes:      Conjunctiva/sclera: Conjunctivae normal.   Cardiovascular:      Rate and Rhythm: Normal rate and regular rhythm.      Pulses: Normal pulses.      Heart sounds: Normal heart sounds. No murmur heard.    No friction rub. No gallop.   Pulmonary:      Effort: Pulmonary effort is normal. No respiratory distress.      Breath sounds: Normal breath sounds. No stridor. No wheezing, rhonchi or rales.   Chest:      Chest wall: No tenderness.   Abdominal:      General: Abdomen is flat.      Palpations: Abdomen is soft. There is no mass.      Tenderness: There is no abdominal tenderness.   Musculoskeletal:      Right lower leg: No edema.      Left lower leg: No edema.   Skin:     General: Skin is warm and dry.   Neurological:      General: No focal deficit present.      Mental Status: She is alert. Mental status is at baseline.   Psychiatric:         Mood and Affect: Mood normal.         Behavior: Behavior normal.         Thought Content: Thought content normal.         Judgment: Judgment normal.        Result Review :   The following data was reviewed by: Jarad Landeros MD on 01/13/2023:  Common labs    Common Labs 11/10/22 11/10/22 11/10/22 11/10/22    1451 1451 1451 1451   Glucose 75      BUN 12      Creatinine 0.71      Sodium 140      Potassium 4.3      Chloride 105      Calcium 9.6      Albumin 4.50      Total Bilirubin 0.3      Alkaline Phosphatase 105      AST (SGOT) 18      ALT (SGPT) 12      WBC   7.37    Hemoglobin   15.4    Hematocrit   46.9 (A)    Platelets   315    Total Cholesterol  192     Triglycerides  284 (A)     HDL Cholesterol  39 (A)     LDL Cholesterol   104 (A)     Hemoglobin A1C    5.70 (A)   (A) Abnormal value                   No results  found for: SARSANTIGEN, COVID19, RAPFLUA, RAPFLUB, FLUAAG, FLUABDAG, FLUABDAG, FLU, FLU, FLUBAG, RAPSCRN, STREPAAG, RSV, POCPREGUR, MONOSPOT, INR, LEADCAPBLD, POCLEAD, BILIRUBINUR    Procedures        Assessment and Plan    Diagnoses and all orders for this visit:    1. Degeneration of lumbar intervertebral disc (Primary)  -     MRI Lumbar Spine Without Contrast; Future    2. Essential hypertension    3. Chronic left-sided low back pain with left-sided sciatica  -     Ambulatory Referral to Pain Management    Other orders  -     Cholecalciferol 100 MCG (4000 UT) capsule; Take 4,000 Int'l Units by mouth Daily.  Dispense: 90 capsule; Refill: 2      HTN:  -BP at goal, will continue current regimen    Medications Discontinued During This Encounter   Medication Reason   • gentamicin (Gentak) 0.3 % ophthalmic ointment           Follow Up   Return if symptoms worsen or fail to improve.  Patient was given instructions and counseling regarding her condition or for health maintenance advice. Please see specific information pulled into the AVS if appropriate.       Jarad Landeros MD  01/13/23  20:32 EST

## 2023-02-02 ENCOUNTER — TELEPHONE (OUTPATIENT)
Dept: INTERNAL MEDICINE | Facility: CLINIC | Age: 58
End: 2023-02-02
Payer: MEDICARE

## 2023-02-02 DIAGNOSIS — J30.9 ALLERGIC RHINITIS, UNSPECIFIED SEASONALITY, UNSPECIFIED TRIGGER: Primary | ICD-10-CM

## 2023-02-02 DIAGNOSIS — L50.8 CHRONIC URTICARIA: ICD-10-CM

## 2023-02-02 NOTE — TELEPHONE ENCOUNTER
PATIENT CALLED AND IS NEEDING  A REFERRAL TO THE [facility] AT [place] FOR CONTINUED CARE.

## 2023-02-08 ENCOUNTER — HOSPITAL ENCOUNTER (OUTPATIENT)
Dept: MRI IMAGING | Facility: HOSPITAL | Age: 58
Discharge: HOME | End: 2023-02-08
Admitting: STUDENT IN AN ORGANIZED HEALTH CARE EDUCATION/TRAINING PROGRAM
Payer: MEDICARE

## 2023-02-08 DIAGNOSIS — M51.36 DEGENERATION OF LUMBAR INTERVERTEBRAL DISC: ICD-10-CM

## 2023-02-08 PROCEDURE — 72148 MRI LUMBAR SPINE W/O DYE: CPT

## 2023-02-10 ENCOUNTER — TELEPHONE (OUTPATIENT)
Dept: INTERNAL MEDICINE | Facility: CLINIC | Age: 58
End: 2023-02-10
Payer: MEDICARE

## 2023-02-10 NOTE — TELEPHONE ENCOUNTER
Attempted to contact patient. Could not leave message, says the number is not in service at this time.     HUB OK TO READ/ADVISE:  MRI shows degenerative changes in multiple discs of your lumbar spine.  There is, in addition, narrowing of the central spinal canal.

## 2023-02-10 NOTE — TELEPHONE ENCOUNTER
----- Message from Jarad Landeros MD sent at 2/9/2023  8:21 AM EST -----  MRI shows degenerative changes in multiple discs of your lumbar spine.  There is, in addition, narrowing of the central spinal canal.

## 2023-02-13 NOTE — TELEPHONE ENCOUNTER
Contacted patient regarding lab results. Patient verified . I informed patient of the below result note. Patient voiced understanding, no further questions.  PATIENT STATED SHE SEES PAIN MANAGEMENT 23

## 2023-04-06 ENCOUNTER — TELEPHONE (OUTPATIENT)
Dept: INTERNAL MEDICINE | Facility: CLINIC | Age: 58
End: 2023-04-06
Payer: MEDICARE

## 2023-04-06 DIAGNOSIS — Z00.00 ANNUAL PHYSICAL EXAM: ICD-10-CM

## 2023-04-06 RX ORDER — AMLODIPINE BESYLATE 10 MG/1
10 TABLET ORAL DAILY
Qty: 90 TABLET | Refills: 1 | Status: SHIPPED | OUTPATIENT
Start: 2023-04-06

## 2023-04-06 NOTE — TELEPHONE ENCOUNTER
Patient came in requesting refill for medication     amLODIPine (NORVASC) 10 MG Tablet    Asked that medication be sent to [facility] and [address] [phone].    Please Advise.

## 2023-04-06 NOTE — TELEPHONE ENCOUNTER
Rx Refill Note  Requested Prescriptions     Pending Prescriptions Disp Refills   • amLODIPine (NORVASC) 10 MG tablet 90 tablet 1     Sig: Take 1 tablet by mouth Daily.      Last office visit with prescribing clinician: 1/13/2023   Last telemedicine visit with prescribing clinician: 5/10/2023   Next office visit with prescribing clinician: 5/10/2023                         Would you like a call back once the refill request has been completed: [] Yes [] No    If the office needs to give you a call back, can they leave a voicemail: [] Yes [] No    Pavan Riley  04/06/23, 16:20 EDT

## 2023-05-10 ENCOUNTER — OFFICE VISIT (OUTPATIENT)
Dept: INTERNAL MEDICINE | Facility: CLINIC | Age: 58
End: 2023-05-10
Payer: MEDICARE

## 2023-05-10 VITALS
DIASTOLIC BLOOD PRESSURE: 83 MMHG | OXYGEN SATURATION: 96 % | WEIGHT: 155.6 LBS | SYSTOLIC BLOOD PRESSURE: 126 MMHG | HEART RATE: 74 BPM | BODY MASS INDEX: 26.56 KG/M2 | HEIGHT: 64 IN | TEMPERATURE: 98.1 F

## 2023-05-10 DIAGNOSIS — I10 ESSENTIAL HYPERTENSION: Primary | ICD-10-CM

## 2023-05-10 DIAGNOSIS — M79.89 SWELLING OF LOWER EXTREMITY: ICD-10-CM

## 2023-05-10 DIAGNOSIS — R73.03 PREDIABETES: ICD-10-CM

## 2023-05-10 LAB
ALBUMIN SERPL-MCNC: 4.5 G/DL (ref 3.5–5.2)
ALBUMIN/GLOB SERPL: 1.6 G/DL
ALP SERPL-CCNC: 128 U/L (ref 39–117)
ALT SERPL W P-5'-P-CCNC: 11 U/L (ref 1–33)
ANION GAP SERPL CALCULATED.3IONS-SCNC: 13.1 MMOL/L (ref 5–15)
AST SERPL-CCNC: 19 U/L (ref 1–32)
BILIRUB SERPL-MCNC: 0.6 MG/DL (ref 0–1.2)
BUN SERPL-MCNC: 9 MG/DL (ref 8–23)
BUN/CREAT SERPL: 13.8 (ref 7–25)
CALCIUM SPEC-SCNC: 9.8 MG/DL (ref 8.6–10.5)
CHLORIDE SERPL-SCNC: 104 MMOL/L (ref 98–107)
CO2 SERPL-SCNC: 23.9 MMOL/L (ref 22–29)
CREAT SERPL-MCNC: 0.65 MG/DL (ref 0.57–1)
EGFRCR SERPLBLD CKD-EPI 2021: 100.9 ML/MIN/1.73
GLOBULIN UR ELPH-MCNC: 2.9 GM/DL
GLUCOSE SERPL-MCNC: 91 MG/DL (ref 65–99)
HBA1C MFR BLD: 5.8 % (ref 4.8–5.6)
POTASSIUM SERPL-SCNC: 3.9 MMOL/L (ref 3.5–5.2)
PROT SERPL-MCNC: 7.4 G/DL (ref 6–8.5)
SODIUM SERPL-SCNC: 141 MMOL/L (ref 136–145)

## 2023-05-10 PROCEDURE — 83036 HEMOGLOBIN GLYCOSYLATED A1C: CPT | Performed by: STUDENT IN AN ORGANIZED HEALTH CARE EDUCATION/TRAINING PROGRAM

## 2023-05-10 PROCEDURE — 80053 COMPREHEN METABOLIC PANEL: CPT | Performed by: STUDENT IN AN ORGANIZED HEALTH CARE EDUCATION/TRAINING PROGRAM

## 2023-05-10 RX ORDER — LISINOPRIL 20 MG/1
20 TABLET ORAL DAILY
Qty: 90 TABLET | Refills: 2 | Status: SHIPPED | OUTPATIENT
Start: 2023-05-10

## 2023-05-10 NOTE — PROGRESS NOTES
"Chief Complaint  Hypertension (Follow up) and Leg Swelling (Patient states her ankles swell with any extended sitting or standing. )    Ashlee Elliott presents to [facility]  History of Present Illness    Went to [place] in the interim to see daughter.    Scheduled to have another spinal injection scheduled tomorrow.  The previous one alleviated pain for about a month.    Doesn't check BP at home.  Adherent to low sodium diet.  Complains that towards the end of the day, legs and ankle are getting swollen.  Requests prescription for compression stockings.      Current Outpatient Medications   Medication Instructions   • cetirizine (zyrTEC) 10 MG tablet cetirizine 10 mg tablet   • Cholecalciferol 100 MCG (4000 UT) capsule 4,000 Int'l Units, Oral, Daily   • diphenhydrAMINE (BENADRYL) 2 % cream 1 application, Topical, 3 Times Daily PRN   • ezetimibe (ZETIA) 10 mg, Oral, Daily   • hydrOXYzine (ATARAX) 25 MG tablet hydroxyzine HCl 25 mg tablet   • lisinopril (PRINIVIL,ZESTRIL) 20 mg, Oral, Daily   • omalizumab (Xolair) 150 MG injection Xolair 150 mg subcutaneous solution       The following portions of the patient's history were reviewed and updated as appropriate: allergies, current medications, past family history, past medical history, past social history, past surgical history, and problem list.    Objective   Vital Signs:   /83 (BP Location: Left arm, Patient Position: Sitting)   Pulse 74   Temp 98.1 °F (36.7 °C) (Temporal)   Ht 162.6 cm (64\")   Wt 70.6 kg (155 lb 9.6 oz)   SpO2 96%   BMI 26.71 kg/m²     Wt Readings from Last 3 Encounters:   05/10/23 70.6 kg (155 lb 9.6 oz)   01/13/23 68.8 kg (151 lb 9.6 oz)   12/08/22 68.6 kg (151 lb 3.2 oz)     BP Readings from Last 3 Encounters:   05/10/23 126/83   01/13/23 128/86   12/08/22 132/83     Physical Exam  Vitals reviewed.   Constitutional:       General: She is not in acute distress.   "   Appearance: Normal appearance. She is not ill-appearing, toxic-appearing or diaphoretic.   HENT:      Head: Normocephalic and atraumatic.      Right Ear: External ear normal.      Left Ear: External ear normal.   Eyes:      Conjunctiva/sclera: Conjunctivae normal.   Cardiovascular:      Rate and Rhythm: Normal rate and regular rhythm.      Pulses: Normal pulses.      Heart sounds: Normal heart sounds. No murmur heard.    No friction rub. No gallop.   Pulmonary:      Effort: Pulmonary effort is normal. No respiratory distress.      Breath sounds: Normal breath sounds. No stridor. No wheezing, rhonchi or rales.   Chest:      Chest wall: No tenderness.   Abdominal:      General: Abdomen is flat.      Palpations: Abdomen is soft. There is no mass.      Tenderness: There is no abdominal tenderness.   Musculoskeletal:      Right lower leg: No edema.      Left lower leg: No edema.   Skin:     General: Skin is warm and dry.   Neurological:      General: No focal deficit present.      Mental Status: She is alert. Mental status is at baseline.   Psychiatric:         Mood and Affect: Mood normal.         Behavior: Behavior normal.         Thought Content: Thought content normal.         Judgment: Judgment normal.         Result Review :   The following data was reviewed by: Jarad Landeros MD on 05/10/2023:  Common labs        11/10/2022    14:51   Common Labs   Glucose 75     BUN 12     Creatinine 0.71     Sodium 140     Potassium 4.3     Chloride 105     Calcium 9.6     Albumin 4.50     Total Bilirubin 0.3     Alkaline Phosphatase 105     AST (SGOT) 18     ALT (SGPT) 12     WBC 7.37     Hemoglobin 15.4     Hematocrit 46.9     Platelets 315     Total Cholesterol 192     Triglycerides 284     HDL Cholesterol 39     LDL Cholesterol  104     Hemoglobin A1C 5.70              No results found for: SARSANTIGEN, COVID19, RAPFLUA, RAPFLUB, FLUAAG, FLUABDAG, FLUABDAG, FLU, FLU, FLUBAG, RAPSCRN, STREPAAG, RSV, POCPREGUR, MONOSPOT,  INR, LEADCAPBLD, POCLEAD, BILIRUBINUR    Procedures        Assessment and Plan    Diagnoses and all orders for this visit:    1. Essential hypertension (Primary)  -     Comprehensive Metabolic Panel  -     lisinopril (PRINIVIL,ZESTRIL) 20 MG tablet; Take 1 tablet by mouth Daily.  Dispense: 90 tablet; Refill: 2    2. Prediabetes  -     Comprehensive Metabolic Panel  -     Hemoglobin A1c    3. Swelling of lower extremity  -     Miscellaneous DME      HTN, LE swelling:  -BP at goal  -will switch from amlodipine to lisinopril  -exam this morning reassuring, showing no LE edema  -placed DME order for compression stockings      Medications Discontinued During This Encounter   Medication Reason   • amLODIPine (NORVASC) 10 MG tablet           Follow Up   Return in about 6 months (around 11/10/2023) for Medicare Wellness.  Patient was given instructions and counseling regarding her condition or for health maintenance advice. Please see specific information pulled into the AVS if appropriate.       Jarad Landeros MD  05/10/23  12:19 EDT

## 2023-05-12 ENCOUNTER — TELEPHONE (OUTPATIENT)
Dept: DIABETES SERVICES | Facility: CLINIC | Age: 58
End: 2023-05-12
Payer: MEDICARE

## 2023-05-12 NOTE — TELEPHONE ENCOUNTER
ATTEMPTED TO  CALL PATIENT, LEFT MESSAGE TO RETURN CALL.    HUB OK TO READ/ADVISE:  ----- Message from Jarad Landeros MD sent at 5/11/2023  6:48 AM EDT -----  Labs overall are reassuring.     CMP (which measures electrolytes, kidney function and liver enzymes) is notable for mildly elevated alkaline phosphatase.  We will monitor this for now.     A1c is stable at 5.8%.

## 2023-05-12 NOTE — TELEPHONE ENCOUNTER
----- Message from Jarad Landeros MD sent at 5/11/2023  6:48 AM EDT -----  Labs overall are reassuring.    CMP (which measures electrolytes, kidney function and liver enzymes) is notable for mildly elevated alkaline phosphatase.  We will monitor this for now.    A1c is stable at 5.8%.

## 2023-05-26 ENCOUNTER — TELEPHONE (OUTPATIENT)
Dept: INTERNAL MEDICINE | Facility: CLINIC | Age: 58
End: 2023-05-26
Payer: MEDICARE

## 2023-05-26 NOTE — TELEPHONE ENCOUNTER
Mr. Samantha Nova is here for management of anticoagulation for AFib. PMH also significant for HTN, HLD, s/p ablation and DM. He presents today w/out complaint. Pt verified dosing regimen. Pt denies s/s bleeding/bruising/swelling/SOB. No BRBPR. No melena. No missed doses. No changes in Rx/OTC/herbal medications. No changes in diet. Denies EtOH and tobacco use. Prescriptions to be filled via [employer]. No changes per pt    INR 2.9 is within the acceptable therapeutic range of 2-3. Recommended to continue dose of 7.5 mg Tue, 5 mg all other days   Pt has 5 mg tabs. Will continue to monitor and check INR in 4 weeks. Dosing reminder card given with phone number, appointment date and time.    Return to clinic: 9/2 @ 0800 am   Referring physician: KESHIA Cui - CHELE Nuñez, PharmD 8:05 AM EDT 8/5/22 That's not a typical side effect of lisinopril.  In fact, it can be used off label to prevent headaches.  I really think it's unlikely to be related to this medicine.

## 2023-05-26 NOTE — TELEPHONE ENCOUNTER
Caller: Kahlil Elliott    Relationship to patient: Self    Best call back number: [phone]    Patient is needing: PATIENT STATES THAT SHE HAS HAD AN INCREASE IN HER LISINOPRIL. PATIENT STATES THAT THE BACK OF HER HEAD HURTS. PATIENT STATES THAT HER BLOOD PRESSURE /66 1 HOUR AGO. PLEASE CALL PATIENT. SHE HAS QUESTIONS ABOUT HER DOSAGE.

## 2023-05-26 NOTE — TELEPHONE ENCOUNTER
PATIENT TRANSFERRED THROUGH THE HUB. PATIENT STATES AFTER THE CHANGE IN HER LISINOPRIL, HER BP HAS IMPROVED BUT SHE IS NOT EXPERIENCING PAIN IN THE BACK OF HER HEAD. WANTS TO KNOW IF THIS IS A SIDE EFFECT AND WHAT SHE NEEDS TO DO ABOUT THIS. PLEASE ADVISE.

## 2024-01-03 DIAGNOSIS — I10 ESSENTIAL HYPERTENSION: ICD-10-CM

## 2024-01-03 RX ORDER — LISINOPRIL 20 MG/1
20 TABLET ORAL DAILY
Qty: 90 TABLET | Refills: 2 | Status: SHIPPED | OUTPATIENT
Start: 2024-01-03

## 2024-01-09 ENCOUNTER — TELEPHONE (OUTPATIENT)
Dept: GASTROENTEROLOGY | Facility: CLINIC | Age: 59
End: 2024-01-09
Payer: MEDICARE

## 2024-01-09 NOTE — TELEPHONE ENCOUNTER
Patient called the office and verified her information then advised that she had gotten a letter about getting her colon recall scheduled. I verbalized understanding and has been scheduled for a Nurse / MA screening call.

## 2024-01-10 ENCOUNTER — OFFICE VISIT (OUTPATIENT)
Dept: INTERNAL MEDICINE | Facility: CLINIC | Age: 59
End: 2024-01-10
Payer: MEDICARE

## 2024-01-10 VITALS
TEMPERATURE: 97.8 F | HEART RATE: 81 BPM | DIASTOLIC BLOOD PRESSURE: 79 MMHG | BODY MASS INDEX: 26.02 KG/M2 | OXYGEN SATURATION: 94 % | SYSTOLIC BLOOD PRESSURE: 117 MMHG | WEIGHT: 152.4 LBS | HEIGHT: 64 IN

## 2024-01-10 DIAGNOSIS — J01.00 ACUTE NON-RECURRENT MAXILLARY SINUSITIS: ICD-10-CM

## 2024-01-10 DIAGNOSIS — H57.12 LEFT EYE PAIN: Primary | ICD-10-CM

## 2024-01-10 DIAGNOSIS — J30.9 ALLERGIC RHINITIS, UNSPECIFIED SEASONALITY, UNSPECIFIED TRIGGER: ICD-10-CM

## 2024-01-10 DIAGNOSIS — E78.5 HYPERLIPIDEMIA, UNSPECIFIED HYPERLIPIDEMIA TYPE: ICD-10-CM

## 2024-01-10 DIAGNOSIS — I10 ESSENTIAL HYPERTENSION: ICD-10-CM

## 2024-01-10 PROCEDURE — 3074F SYST BP LT 130 MM HG: CPT | Performed by: STUDENT IN AN ORGANIZED HEALTH CARE EDUCATION/TRAINING PROGRAM

## 2024-01-10 PROCEDURE — 99214 OFFICE O/P EST MOD 30 MIN: CPT | Performed by: STUDENT IN AN ORGANIZED HEALTH CARE EDUCATION/TRAINING PROGRAM

## 2024-01-10 PROCEDURE — 3078F DIAST BP <80 MM HG: CPT | Performed by: STUDENT IN AN ORGANIZED HEALTH CARE EDUCATION/TRAINING PROGRAM

## 2024-01-10 RX ORDER — EZETIMIBE 10 MG/1
10 TABLET ORAL DAILY
Qty: 90 TABLET | Refills: 3 | Status: SHIPPED | OUTPATIENT
Start: 2024-01-10

## 2024-01-10 RX ORDER — AMOXICILLIN AND CLAVULANATE POTASSIUM 875; 125 MG/1; MG/1
1 TABLET, FILM COATED ORAL 2 TIMES DAILY
Qty: 10 TABLET | Refills: 0 | Status: SHIPPED | OUTPATIENT
Start: 2024-01-10 | End: 2024-01-15

## 2024-01-10 NOTE — PROGRESS NOTES
"Chief Complaint  Eye Pain (Left eye pain. Pt states she has been having eye pain and swelling, but it has gotten a little better. ) and Allergies    Subjective          Kahlil Elliott presents to [facility]  History of Present Illness    Here with a weak of swelling around left eye.  No fever, but having cough/congestion.  No pain in eye per se, and no visual changes, but area around eye feels \"bruised\".  Swelling has improved from its worst.    No new makeup.  No visual changes.    Also  requesting new referral to allergy for her allergy shots.    PHQ-9 Depression Screening  Little interest or pleasure in doing things? 0-->not at all   Feeling down, depressed, or hopeless? 0-->not at all   Trouble falling or staying asleep, or sleeping too much?     Feeling tired or having little energy?     Poor appetite or overeating?     Feeling bad about yourself - or that you are a failure or have let yourself or your family down?     Trouble concentrating on things, such as reading the newspaper or watching television?     Moving or speaking so slowly that other people could have noticed? Or the opposite - being so fidgety or restless that you have been moving around a lot more than usual?     Thoughts that you would be better off dead, or of hurting yourself in some way?     PHQ-9 Total Score 0   If you checked off any problems, how difficult have these problems made it for you to do your work, take care of things at home, or get along with other people?           Current Outpatient Medications   Medication Instructions    amoxicillin-clavulanate (AUGMENTIN) 875-125 MG per tablet 1 tablet, Oral, 2 Times Daily    cetirizine (zyrTEC) 10 MG tablet cetirizine 10 mg tablet    Cholecalciferol 100 MCG (4000 UT) capsule 4,000 Int'l Units, Oral, Daily    diphenhydrAMINE (BENADRYL) 2 % cream 1 application , Topical, 3 Times Daily PRN    ezetimibe (ZETIA) 10 mg, Oral, Daily    " "fluticasone (FLONASE) 50 MCG/ACT nasal spray 2 sprays, Nasal, 2 Times Daily PRN    hydrOXYzine (ATARAX) 25 MG tablet hydroxyzine HCl 25 mg tablet    lisinopril (PRINIVIL,ZESTRIL) 20 mg, Oral, Daily    omalizumab (Xolair) 150 MG injection Xolair 150 mg subcutaneous solution    tiZANidine (ZANAFLEX) 4 MG tablet 1 tablet, 3 Times Daily       The following portions of the patient's history were reviewed and updated as appropriate: allergies, current medications, past family history, past medical history, past social history, past surgical history, and problem list.    Objective   Vital Signs:   /79 (BP Location: Right arm, Patient Position: Sitting, Cuff Size: Adult)   Pulse 81   Temp 97.8 °F (36.6 °C) (Temporal)   Ht 162.6 cm (64\")   Wt 69.1 kg (152 lb 6.4 oz)   SpO2 94%   BMI 26.16 kg/m²     BP Readings from Last 3 Encounters:   01/10/24 117/79   07/18/23 131/91   05/10/23 126/83     Wt Readings from Last 3 Encounters:   01/10/24 69.1 kg (152 lb 6.4 oz)   07/18/23 67.1 kg (148 lb)   05/10/23 70.6 kg (155 lb 9.6 oz)         Physical Exam  Vitals reviewed.   Constitutional:       General: She is not in acute distress.     Appearance: Normal appearance. She is not ill-appearing, toxic-appearing or diaphoretic.   HENT:      Head: Normocephalic and atraumatic.      Comments: Frontal and maxillary sinuses NTTP.  Noticeable edema noted, around left maxillary region.  No edema around eyelids     Right Ear: Tympanic membrane, ear canal and external ear normal.      Left Ear: Tympanic membrane, ear canal and external ear normal.   Eyes:      Extraocular Movements: Extraocular movements intact.      Conjunctiva/sclera: Conjunctivae normal.      Pupils: Pupils are equal, round, and reactive to light.   Cardiovascular:      Rate and Rhythm: Normal rate and regular rhythm.      Pulses: Normal pulses.      Heart sounds: Normal heart sounds. No murmur heard.     No friction rub. No gallop.   Pulmonary:      Effort: " "Pulmonary effort is normal. No respiratory distress.      Breath sounds: Normal breath sounds. No stridor. No wheezing, rhonchi or rales.   Chest:      Chest wall: No tenderness.   Abdominal:      General: Abdomen is flat.      Palpations: Abdomen is soft. There is no mass.      Tenderness: There is no abdominal tenderness.   Musculoskeletal:      Right lower leg: No edema.      Left lower leg: No edema.   Skin:     General: Skin is warm and dry.   Neurological:      General: No focal deficit present.      Mental Status: She is alert. Mental status is at baseline.   Psychiatric:         Behavior: Behavior normal.         Thought Content: Thought content normal.         Judgment: Judgment normal.          Result Review :   The following data was reviewed by: Jarad Landeros MD on 01/10/2024:  Common labs          5/10/2023    10:37   Common Labs   Glucose 91    BUN 9    Creatinine 0.65    Sodium 141    Potassium 3.9    Chloride 104    Calcium 9.8    Albumin 4.5    Total Bilirubin 0.6    Alkaline Phosphatase 128    AST (SGOT) 19    ALT (SGPT) 11    Hemoglobin A1C 5.80             No results found for: \"SARSANTIGEN\", \"COVID19\", \"RAPFLUA\", \"RAPFLUB\", \"FLUAAG\", \"FLUABDAG\", \"FLU\", \"FLUBAG\", \"RAPSCRN\", \"STREPAAG\", \"RSV\", \"POCPREGUR\", \"MONOSPOT\", \"INR\", \"LEADCAPBLD\", \"POCLEAD\", \"BILIRUBINUR\"    Procedures        Assessment and Plan    Diagnoses and all orders for this visit:    1. Left eye pain (Primary)  -     Ambulatory Referral to Allergy    2. Hyperlipidemia, unspecified hyperlipidemia type  -     ezetimibe (ZETIA) 10 MG tablet; Take 1 tablet by mouth Daily.  Dispense: 90 tablet; Refill: 3    3. Acute non-recurrent maxillary sinusitis  -     amoxicillin-clavulanate (AUGMENTIN) 875-125 MG per tablet; Take 1 tablet by mouth 2 (Two) Times a Day for 5 days.  Dispense: 10 tablet; Refill: 0    4. Allergic rhinitis, unspecified seasonality, unspecified trigger  -     Ambulatory Referral to Allergy    5. Essential " hypertension      Sinusitis:  -I did consider periorbital cellulitis, allergic reaction as well as glaucoma  -overall, reassuring exam, with mild swelling around left cheek of face  -will trial augmentin    HTN:  -BP at goal, will continue lisinopril    Medications Discontinued During This Encounter   Medication Reason    ezetimibe (ZETIA) 10 MG tablet Reorder          Follow Up   Return if symptoms worsen or fail to improve.  Patient was given instructions and counseling regarding her condition or for health maintenance advice. Please see specific information pulled into the AVS if appropriate.       Jarad Landeros MD  01/10/24  15:00 EST

## 2024-02-23 ENCOUNTER — OFFICE VISIT (OUTPATIENT)
Dept: INTERNAL MEDICINE | Facility: CLINIC | Age: 59
End: 2024-02-23
Payer: MEDICARE

## 2024-02-23 VITALS
HEART RATE: 78 BPM | OXYGEN SATURATION: 96 % | HEIGHT: 64 IN | BODY MASS INDEX: 25.32 KG/M2 | WEIGHT: 148.3 LBS | DIASTOLIC BLOOD PRESSURE: 80 MMHG | TEMPERATURE: 98.6 F | SYSTOLIC BLOOD PRESSURE: 114 MMHG

## 2024-02-23 DIAGNOSIS — J30.9 ALLERGIC RHINITIS, UNSPECIFIED SEASONALITY, UNSPECIFIED TRIGGER: ICD-10-CM

## 2024-02-23 DIAGNOSIS — E78.5 HYPERLIPIDEMIA, UNSPECIFIED HYPERLIPIDEMIA TYPE: ICD-10-CM

## 2024-02-23 DIAGNOSIS — E66.3 OVERWEIGHT (BMI 25.0-29.9): ICD-10-CM

## 2024-02-23 DIAGNOSIS — J30.1 SEASONAL ALLERGIC RHINITIS DUE TO POLLEN: ICD-10-CM

## 2024-02-23 DIAGNOSIS — M19.90 ARTHRITIS: ICD-10-CM

## 2024-02-23 DIAGNOSIS — Z12.4 CERVICAL CANCER SCREENING: ICD-10-CM

## 2024-02-23 DIAGNOSIS — M51.36 DEGENERATION OF LUMBAR INTERVERTEBRAL DISC: ICD-10-CM

## 2024-02-23 DIAGNOSIS — Z00.00 MEDICARE ANNUAL WELLNESS VISIT, SUBSEQUENT: Primary | ICD-10-CM

## 2024-02-23 DIAGNOSIS — Z85.3 HISTORY OF BREAST CANCER: ICD-10-CM

## 2024-02-23 DIAGNOSIS — I10 ESSENTIAL HYPERTENSION: ICD-10-CM

## 2024-02-23 DIAGNOSIS — R73.03 PREDIABETES: ICD-10-CM

## 2024-02-23 DIAGNOSIS — L50.8 CHRONIC URTICARIA: ICD-10-CM

## 2024-02-23 LAB
ALBUMIN SERPL-MCNC: 4.4 G/DL (ref 3.5–5.2)
ALBUMIN/GLOB SERPL: 1.6 G/DL
ALP SERPL-CCNC: 117 U/L (ref 39–117)
ALT SERPL W P-5'-P-CCNC: 12 U/L (ref 1–33)
ANION GAP SERPL CALCULATED.3IONS-SCNC: 11.1 MMOL/L (ref 5–15)
AST SERPL-CCNC: 23 U/L (ref 1–32)
BASOPHILS # BLD AUTO: 0.04 10*3/MM3 (ref 0–0.2)
BASOPHILS NFR BLD AUTO: 0.7 % (ref 0–1.5)
BILIRUB SERPL-MCNC: 0.5 MG/DL (ref 0–1.2)
BUN SERPL-MCNC: 10 MG/DL (ref 8–23)
BUN/CREAT SERPL: 14.7 (ref 7–25)
CALCIUM SPEC-SCNC: 9.5 MG/DL (ref 8.6–10.5)
CHLORIDE SERPL-SCNC: 105 MMOL/L (ref 98–107)
CHOLEST SERPL-MCNC: 198 MG/DL (ref 0–200)
CO2 SERPL-SCNC: 22.9 MMOL/L (ref 22–29)
CREAT SERPL-MCNC: 0.68 MG/DL (ref 0.57–1)
DEPRECATED RDW RBC AUTO: 40.4 FL (ref 37–54)
EGFRCR SERPLBLD CKD-EPI 2021: 99.8 ML/MIN/1.73
EOSINOPHIL # BLD AUTO: 0.08 10*3/MM3 (ref 0–0.4)
EOSINOPHIL NFR BLD AUTO: 1.4 % (ref 0.3–6.2)
ERYTHROCYTE [DISTWIDTH] IN BLOOD BY AUTOMATED COUNT: 12.4 % (ref 12.3–15.4)
GLOBULIN UR ELPH-MCNC: 2.7 GM/DL
GLUCOSE SERPL-MCNC: 79 MG/DL (ref 65–99)
HBA1C MFR BLD: 5.8 % (ref 4.8–5.6)
HCT VFR BLD AUTO: 41.6 % (ref 34–46.6)
HDLC SERPL-MCNC: 43 MG/DL (ref 40–60)
HGB BLD-MCNC: 14 G/DL (ref 12–15.9)
IMM GRANULOCYTES # BLD AUTO: 0.01 10*3/MM3 (ref 0–0.05)
IMM GRANULOCYTES NFR BLD AUTO: 0.2 % (ref 0–0.5)
LDLC SERPL CALC-MCNC: 110 MG/DL (ref 0–100)
LDLC/HDLC SERPL: 2.39 {RATIO}
LYMPHOCYTES # BLD AUTO: 1.8 10*3/MM3 (ref 0.7–3.1)
LYMPHOCYTES NFR BLD AUTO: 31.5 % (ref 19.6–45.3)
MCH RBC QN AUTO: 30.1 PG (ref 26.6–33)
MCHC RBC AUTO-ENTMCNC: 33.7 G/DL (ref 31.5–35.7)
MCV RBC AUTO: 89.5 FL (ref 79–97)
MONOCYTES # BLD AUTO: 0.4 10*3/MM3 (ref 0.1–0.9)
MONOCYTES NFR BLD AUTO: 7 % (ref 5–12)
NEUTROPHILS NFR BLD AUTO: 3.39 10*3/MM3 (ref 1.7–7)
NEUTROPHILS NFR BLD AUTO: 59.2 % (ref 42.7–76)
NRBC BLD AUTO-RTO: 0 /100 WBC (ref 0–0.2)
PLATELET # BLD AUTO: 291 10*3/MM3 (ref 140–450)
PMV BLD AUTO: 11.3 FL (ref 6–12)
POTASSIUM SERPL-SCNC: 4.1 MMOL/L (ref 3.5–5.2)
PROT SERPL-MCNC: 7.1 G/DL (ref 6–8.5)
RBC # BLD AUTO: 4.65 10*6/MM3 (ref 3.77–5.28)
SODIUM SERPL-SCNC: 139 MMOL/L (ref 136–145)
TRIGL SERPL-MCNC: 262 MG/DL (ref 0–150)
TSH SERPL DL<=0.05 MIU/L-ACNC: 2.42 UIU/ML (ref 0.27–4.2)
VLDLC SERPL-MCNC: 45 MG/DL (ref 5–40)
WBC NRBC COR # BLD AUTO: 5.72 10*3/MM3 (ref 3.4–10.8)

## 2024-02-23 PROCEDURE — 84443 ASSAY THYROID STIM HORMONE: CPT | Performed by: STUDENT IN AN ORGANIZED HEALTH CARE EDUCATION/TRAINING PROGRAM

## 2024-02-23 PROCEDURE — 85025 COMPLETE CBC W/AUTO DIFF WBC: CPT | Performed by: STUDENT IN AN ORGANIZED HEALTH CARE EDUCATION/TRAINING PROGRAM

## 2024-02-23 PROCEDURE — 80053 COMPREHEN METABOLIC PANEL: CPT | Performed by: STUDENT IN AN ORGANIZED HEALTH CARE EDUCATION/TRAINING PROGRAM

## 2024-02-23 PROCEDURE — 83036 HEMOGLOBIN GLYCOSYLATED A1C: CPT | Performed by: STUDENT IN AN ORGANIZED HEALTH CARE EDUCATION/TRAINING PROGRAM

## 2024-02-23 PROCEDURE — 80061 LIPID PANEL: CPT | Performed by: STUDENT IN AN ORGANIZED HEALTH CARE EDUCATION/TRAINING PROGRAM

## 2024-02-23 NOTE — PATIENT INSTRUCTIONS
Cooking With Less Salt  Cooking with less salt is one way to reduce the amount of sodium you get from food. Sodium is one of the elements that make up salt. It is found naturally in foods and is also added to certain foods. Depending on your condition and overall health, your health care provider or dietitian may recommend that you reduce your sodium intake. Most people should have less than 2,300 milligrams (mg) of sodium each day. If you have high blood pressure (hypertension), you may need to limit your sodium to 1,500 mg each day. Follow the tips below to help reduce your sodium intake.  What are tips for eating less sodium?  Reading food labels       Check the food label before buying or using packaged ingredients. Always check the label for the serving size and sodium content.  Look for products with no more than 140 mg of sodium in one serving.  Check the % Daily Value column to see what percent of the daily recommended amount of sodium is provided in one serving of the product. Foods with 5% or less in this column are considered low in sodium. Foods with 20% or higher are considered high in sodium.  Do not choose foods with salt as one of the first three ingredients on the ingredients list. If salt is one of the first three ingredients, it usually means the item is high in sodium.     Shopping  Buy sodium-free or low-sodium products. Look for the following words on food labels:  Low-sodium.  Sodium-free.  Reduced-sodium.  No salt added.  Unsalted.  Always check the sodium content even if foods are labeled as low-sodium or no salt added.  Buy fresh foods.  Cooking  Use herbs, seasonings without salt, and spices as substitutes for salt.  Use sodium-free baking soda when baking.  [place], braise, or roast foods to add flavor with less salt.  Avoid adding salt to pasta, rice, or hot cereals.  Drain and rinse canned vegetables, beans, and meat before use.  Avoid adding salt when cooking sweets and desserts.  Cook  "with low-sodium ingredients.  What foods are high in sodium?  Vegetables  Regular canned vegetables (not low-sodium or reduced-sodium). Sauerkraut, pickled vegetables, and relishes. Olives. French fries. Onion rings. Regular canned tomato sauce and paste. Regular tomato and vegetable juice. Frozen vegetables in sauces.  Grains  Instant hot cereals. Bread stuffing, pancake, and biscuit mixes. Croutons. Seasoned rice or pasta mixes. Noodle soup cups. Boxed or frozen macaroni and cheese. Regular salted crackers. Self-rising flour. Rolls. Bagels. Flour tortillas and wraps.  Meats and other proteins  Meat or fish that is salted, canned, smoked, cured, spiced, or pickled. This includes renteria, ham, sausages, hot dogs, corned beef, chipped beef, meat loaves, salt pork, jerky, pickled herring, anchovies, regular canned tuna, and sardines. Salted nuts.  Dairy  Processed cheese and cheese spreads. Cheese curds. Blue cheese. Feta cheese. String cheese. Regular cottage cheese. Buttermilk. Canned milk.  The items listed above may not be a complete list of foods high in sodium. Actual amounts of sodium may be different depending on processing. Contact a dietitian for more information.  What foods are low in sodium?  Fruits  Fresh, frozen, or canned fruit with no sauce added. Fruit juice.  Vegetables  Fresh or frozen vegetables with no sauce added. \"No salt added\" canned vegetables. \"No salt added\" tomato sauce and paste. Low-sodium or reduced-sodium tomato and vegetable juice.  Grains  Noodles, pasta, quinoa, rice. Shredded or puffed wheat or puffed rice. Regular or quick oats (not instant). Low-sodium crackers. Low-sodium bread. Whole-grain bread and whole-grain pasta. Unsalted popcorn.  Meats and other proteins  Fresh or frozen whole meats, poultry (not injected with sodium), and fish with no sauce added. Unsalted nuts. Dried peas, beans, and lentils without added salt. Unsalted canned beans. Eggs. Unsalted nut butters. " Low-sodium canned tuna or chicken.  Dairy  Milk. Soy milk. Yogurt. Low-sodium cheeses, such as Swiss, Rosanky Guille, mozzarella, and ricotta. Sherbet or ice cream (keep to ½ cup per serving). Cream cheese.  Fats and oils  Unsalted butter or margarine.  Other foods  Homemade pudding. Sodium-free baking soda and baking powder. Herbs and spices. Low-sodium seasoning mixes.  Beverages  Coffee and tea. Carbonated beverages.  The items listed above may not be a complete list of foods low in sodium. Actual amounts of sodium may be different depending on processing. Contact a dietitian for more information.  What are some salt alternatives when cooking?  The following are herbs, seasonings, and spices that can be used instead of salt to flavor your food. Herbs should be fresh or dried. Do not choose packaged mixes. Next to the name of the herb, spice, or seasoning are some examples of foods you can pair it with.  Herbs  Bay leaves - Soups, meat and vegetable dishes, and spaghetti sauce.  Basil - Italian dishes, soups, pasta, and fish dishes.  Cilantro - Meat, poultry, and vegetable dishes.  Chili powder - Marinades and Mexican dishes.  Chives - Salad dressings and potato dishes.  Cumin - Mexican dishes, couscous, and meat dishes.  Dill - Fish dishes, sauces, and salads.  Fennel - Meat and vegetable dishes, breads, and cookies.  Garlic (do not use garlic salt) - Italian dishes, meat dishes, salad dressings, and sauces.  Marjoram - Soups, potato dishes, and meat dishes.  Oregano - Pizza and spaghetti sauce.  Parsley - Salads, soups, pasta, and meat dishes.  Ynes - Italian dishes, salad dressings, soups, and red meats.  Saffron - Fish dishes, pasta, and some poultry dishes.  Rui - Stuffings and sauces.  Tarragon - Fish and poultry dishes.  Thyme - Stuffing, meat, and fish dishes.  Seasonings  Lemon juice - Fish dishes, poultry dishes, vegetables, and salads.  Vinegar - Salad dressings, vegetables, and fish  "dishes.  Spices  Cinnamon - Sweet dishes, such as cakes, cookies, and puddings.  Cloves - Gingerbread, puddings, and marinades for meats.  [place] - Vegetable dishes, fish and poultry dishes, and stir-avina dishes.  Karol - Vegetable dishes, fish dishes, and stir-avina dishes.  Nutmeg - Pasta, vegetables, poultry, fish dishes, and custard.  Summary  Cooking with less salt is one way to reduce the amount of sodium that you get from food.  Buy sodium-free or low-sodium products.  Check the food label before using or buying packaged ingredients.  Use herbs, seasonings without salt, and spices as substitutes for salt in foods.  This information is not intended to replace advice given to you by your health care provider. Make sure you discuss any questions you have with your health care provider.  Document Revised: 12/09/2020 Document Reviewed: 12/09/2020  [employer] Patient Education © 2021 [employer]      https://www.nhlbi.nih.gov/files/docs/public/heart/dash_brief.pdf\">   DASH Eating Plan  DASH stands for Dietary Approaches to Stop Hypertension. The DASH eating plan is a healthy eating plan that has been shown to:  Reduce high blood pressure (hypertension).  Reduce your risk for type 2 diabetes, heart disease, and stroke.  Help with weight loss.  What are tips for following this plan?  Reading food labels  Check food labels for the amount of salt (sodium) per serving. Choose foods with less than 5 percent of the Daily Value of sodium. Generally, foods with less than 300 milligrams (mg) of sodium per serving fit into this eating plan.  To find whole grains, look for the word \"whole\" as the first word in the ingredient list.  Shopping  Buy products labeled as \"low-sodium\" or \"no salt added.\"  Buy fresh foods. Avoid canned foods and pre-made or frozen meals.  Cooking  Avoid adding salt when cooking. Use salt-free seasonings or herbs instead of table salt or sea salt. Check with your health care provider or pharmacist before " using salt substitutes.  Do not avina foods. Cook foods using healthy methods such as baking, boiling, grilling, roasting, and broiling instead.  Cook with heart-healthy oils, such as olive, canola, avocado, soybean, or sunflower oil.  Meal planning       Eat a balanced diet that includes:  4 or more servings of fruits and 4 or more servings of vegetables each day. Try to fill one-half of your plate with fruits and vegetables.  6-8 servings of whole grains each day.  Less than 6 oz (170 g) of lean meat, poultry, or fish each day. A 3-oz (85-g) serving of meat is about the same size as a deck of cards. One egg equals 1 oz (28 g).  2-3 servings of low-fat dairy each day. One serving is 1 cup (237 mL).  1 serving of nuts, seeds, or beans 5 times each week.  2-3 servings of heart-healthy fats. Healthy fats called omega-3 fatty acids are found in foods such as walnuts, flaxseeds, fortified milks, and eggs. These fats are also found in cold-water fish, such as sardines, salmon, and mackerel.  Limit how much you eat of:  Canned or prepackaged foods.  Food that is high in trans fat, such as some fried foods.  Food that is high in saturated fat, such as fatty meat.  Desserts and other sweets, sugary drinks, and other foods with added sugar.  Full-fat dairy products.  Do not salt foods before eating.  Do not eat more than 4 egg yolks a week.  Try to eat at least 2 vegetarian meals a week.  Eat more home-cooked food and less restaurant, buffet, and fast food.     Lifestyle  When eating at a restaurant, ask that your food be prepared with less salt or no salt, if possible.  If you drink alcohol:  Limit how much you use to:  0-1 drink a day for women who are not pregnant.  0-2 drinks a day for men.  Be aware of how much alcohol is in your drink. In the [place] one drink equals one 12 oz bottle of beer (355 mL), one 5 oz glass of wine (148 mL), or one 1½ oz glass of hard liquor (44 mL).  General information  Avoid eating more than  2,300 mg of salt a day. If you have hypertension, you may need to reduce your sodium intake to 1,500 mg a day.  Work with your health care provider to maintain a healthy body weight or to lose weight. Ask what an ideal weight is for you.  Get at least 30 minutes of exercise that causes your heart to beat faster (aerobic exercise) most days of the week. Activities may include walking, swimming, or biking.  Work with your health care provider or dietitian to adjust your eating plan to your individual calorie needs.  What foods should I eat?  Fruits  All fresh, dried, or frozen fruit. Canned fruit in natural juice (without added sugar).  Vegetables  Fresh or frozen vegetables (raw, steamed, roasted, or grilled). Low-sodium or reduced-sodium tomato and vegetable juice. Low-sodium or reduced-sodium tomato sauce and tomato paste. Low-sodium or reduced-sodium canned vegetables.  Grains  Whole-grain or whole-wheat bread. Whole-grain or whole-wheat pasta. Brown rice. Oatmeal. Quinoa. Bulgur. Whole-grain and low-sodium cereals. Sharri bread. Low-fat, low-sodium crackers. Whole-wheat flour tortillas.  Meats and other proteins  Skinless chicken or turkey. Ground chicken or turkey. Pork with fat trimmed off. Fish and seafood. Egg whites. Dried beans, peas, or lentils. Unsalted nuts, nut butters, and seeds. Unsalted canned beans. Lean cuts of beef with fat trimmed off. Low-sodium, lean precooked or cured meat, such as sausages or meat loaves.  Dairy  Low-fat (1%) or fat-free (skim) milk. Reduced-fat, low-fat, or fat-free cheeses. Nonfat, low-sodium ricotta or cottage cheese. Low-fat or nonfat yogurt. Low-fat, low-sodium cheese.  Fats and oils  Soft margarine without trans fats. Vegetable oil. Reduced-fat, low-fat, or light mayonnaise and salad dressings (reduced-sodium). Canola, safflower, olive, avocado, soybean, and sunflower oils. Avocado.  Seasonings and condiments  Herbs. Spices. Seasoning mixes without salt.  Other  foods  Unsalted popcorn and pretzels. Fat-free sweets.  The items listed above may not be a complete list of foods and beverages you can eat. Contact a dietitian for more information.  What foods should I avoid?  Fruits  Canned fruit in a light or heavy syrup. Fried fruit. Fruit in cream or butter sauce.  Vegetables  Creamed or fried vegetables. Vegetables in a cheese sauce. Regular canned vegetables (not low-sodium or reduced-sodium). Regular canned tomato sauce and paste (not low-sodium or reduced-sodium). Regular tomato and vegetable juice (not low-sodium or reduced-sodium). Pickles. Olives.  Grains  Baked goods made with fat, such as croissants, muffins, or some breads. Dry pasta or rice meal packs.  Meats and other proteins  Fatty cuts of meat. Ribs. Fried meat. Lopez. Bologna, salami, and other precooked or cured meats, such as sausages or meat loaves. Fat from the back of a pig (fatback). Bratwurst. Salted nuts and seeds. Canned beans with added salt. Canned or smoked fish. Whole eggs or egg yolks. Chicken or turkey with skin.  Dairy  Whole or 2% milk, cream, and half-and-half. Whole or full-fat cream cheese. Whole-fat or sweetened yogurt. Full-fat cheese. Nondairy creamers. Whipped toppings. Processed cheese and cheese spreads.  Fats and oils  Butter. Stick margarine. Lard. Shortening. Ghee. Lopez fat. Tropical oils, such as coconut, palm kernel, or palm oil.  Seasonings and condiments  Onion salt, garlic salt, seasoned salt, table salt, and sea salt. Worcestershire sauce. Tartar sauce. Barbecue sauce. Teriyaki sauce. Soy sauce, including reduced-sodium. Steak sauce. Canned and packaged gravies. Fish sauce. Oyster sauce. Cocktail sauce. Store-bought horseradish. Ketchup. Mustard. Meat flavorings and tenderizers. Bouillon cubes. Hot sauces. Pre-made or packaged marinades. Pre-made or packaged taco seasonings. Relishes. Regular salad dressings.  Other foods  Salted popcorn and pretzels.  The items listed above  may not be a complete list of foods and beverages you should avoid. Contact a dietitian for more information.  Where to find more information  National Heart, Lung, and Blood [place]: www.nhlbi.nih.gov  American Heart Association: www.heart.org  Academy of Nutrition and Dietetics: www.eatright.org  National Kidney Foundation: www.kidney.org  Summary  The DASH eating plan is a healthy eating plan that has been shown to reduce high blood pressure (hypertension). It may also reduce your risk for type 2 diabetes, heart disease, and stroke.  When on the DASH eating plan, aim to eat more fresh fruits and vegetables, whole grains, lean proteins, low-fat dairy, and heart-healthy fats.  With the DASH eating plan, you should limit salt (sodium) intake to 2,300 mg a day. If you have hypertension, you may need to reduce your sodium intake to 1,500 mg a day.  Work with your health care provider or dietitian to adjust your eating plan to your individual calorie needs.  This information is not intended to replace advice given to you by your health care provider. Make sure you discuss any questions you have with your health care provider.  Document Revised: 11/20/2020 Document Reviewed: 11/20/2020  [employer] Patient Education © 2021 [employer].       Heart-Healthy Eating Plan  Heart-healthy meal planning includes:  Eating less unhealthy fats.  Eating more healthy fats.  Making other changes in your diet.  Talk with your doctor or a diet specialist (dietitian) to create an eating plan that is right for you.  What is my plan?  Your doctor may recommend an eating plan that includes:  Total fat: ______% or less of total calories a day.  Saturated fat: ______% or less of total calories a day.  Cholesterol: less than _________mg a day.  What are tips for following this plan?  Cooking  Avoid frying your food. Try to bake, boil, grill, or broil it instead. You can also reduce fat by:  Removing the skin from poultry.  Removing all  visible fats from meats.  Steaming vegetables in water or broth.  Meal planning       At meals, divide your plate into four equal parts:  Fill one-half of your plate with vegetables and green salads.  Fill one-fourth of your plate with whole grains.  Fill one-fourth of your plate with lean protein foods.  Eat 4-5 servings of vegetables per day. A serving of vegetables is:  1 cup of raw or cooked vegetables.  2 cups of raw leafy greens.  Eat 4-5 servings of fruit per day. A serving of fruit is:  1 medium whole fruit.  ¼ cup of dried fruit.  ½ cup of fresh, frozen, or canned fruit.  ½ cup of 100% fruit juice.  Eat more foods that have soluble fiber. These are apples, broccoli, carrots, beans, peas, and barley. Try to get 20-30 g of fiber per day.  Eat 4-5 servings of nuts, legumes, and seeds per week:  1 serving of dried beans or legumes equals ½ cup after being cooked.  1 serving of nuts is ¼ cup.  1 serving of seeds equals 1 tablespoon.     General information  Eat more home-cooked food. Eat less restaurant, buffet, and fast food.  Limit or avoid alcohol.  Limit foods that are high in starch and sugar.  Avoid fried foods.  Lose weight if you are overweight.  Keep track of how much salt (sodium) you eat. This is important if you have high blood pressure. Ask your doctor to tell you more about this.  Try to add vegetarian meals each week.  Fats  Choose healthy fats. These include olive oil and canola oil, flaxseeds, walnuts, almonds, and seeds.  Eat more omega-3 fats. These include salmon, mackerel, sardines, tuna, flaxseed oil, and ground flaxseeds. Try to eat fish at least 2 times each week.  Check food labels. Avoid foods with trans fats or high amounts of saturated fat.  Limit saturated fats.  These are often found in animal products, such as meats, butter, and cream.  These are also found in plant foods, such as palm oil, palm kernel oil, and coconut oil.  Avoid foods with partially hydrogenated oils in them.  These have trans fats. Examples are stick margarine, some tub margarines, cookies, crackers, and other baked goods.  What foods can I eat?  Fruits  All fresh, canned (in natural juice), or frozen fruits.  Vegetables  Fresh or frozen vegetables (raw, steamed, roasted, or grilled). Green salads.  Grains  Most grains. Choose whole wheat and whole grains most of the time. Rice and pasta, including brown rice and pastas made with whole wheat.  Meats and other proteins  Lean, well-trimmed beef, veal, pork, and lamb. Chicken and turkey without skin. All fish and shellfish. Wild duck, rabbit, pheasant, and venison. Egg whites or low-cholesterol egg substitutes. Dried beans, peas, lentils, and tofu. Seeds and most nuts.  Dairy  Low-fat or nonfat cheeses, including ricotta and mozzarella. Skim or 1% milk that is liquid, powdered, or evaporated. Buttermilk that is made with low-fat milk. Nonfat or low-fat yogurt.  Fats and oils  Non-hydrogenated (trans-free) margarines. Vegetable oils, including soybean, sesame, sunflower, olive, peanut, safflower, corn, canola, and cottonseed. Salad dressings or mayonnaise made with a vegetable oil.  Beverages  Mineral water. Coffee and tea. Diet carbonated beverages.  Sweets and desserts  Sherbet, gelatin, and fruit ice. Small amounts of dark chocolate.  Limit all sweets and desserts.  Seasonings and condiments  All seasonings and condiments.  The items listed above may not be a complete list of foods and drinks you can eat. Contact a dietitian for more options.  What foods should I avoid?  Fruits  Canned fruit in heavy syrup. Fruit in cream or butter sauce. Fried fruit. Limit coconut.  Vegetables  Vegetables cooked in cheese, cream, or butter sauce. Fried vegetables.  Grains  Breads that are made with saturated or trans fats, oils, or whole milk. Croissants. Sweet rolls. Donuts. High-fat crackers, such as cheese crackers.  Meats and other proteins  Fatty meats, such as hot dogs, ribs,  sausage, renteria, rib-eye roast or steak. High-fat deli meats, such as salami and bologna. Caviar. Domestic duck and goose. Organ meats, such as liver.  Dairy  Cream, sour cream, cream cheese, and creamed cottage cheese. Whole-milk cheeses. Whole or 2% milk that is liquid, evaporated, or condensed. Whole buttermilk. Cream sauce or high-fat cheese sauce. Yogurt that is made from whole milk.  Fats and oils  Meat fat, or shortening. Cocoa butter, hydrogenated oils, palm oil, coconut oil, palm kernel oil. Solid fats and shortenings, including renteria fat, salt pork, lard, and butter. Nondairy cream substitutes. Salad dressings with cheese or sour cream.  Beverages  Regular sodas and juice drinks with added sugar.  Sweets and desserts  Frosting. Pudding. Cookies. Cakes. Pies. Milk chocolate or white chocolate. Buttered syrups. Full-fat ice cream or ice cream drinks.  The items listed above may not be a complete list of foods and drinks to avoid. Contact a dietitian for more information.  Summary  Heart-healthy meal planning includes eating less unhealthy fats, eating more healthy fats, and making other changes in your diet.  Eat a balanced diet. This includes fruits and vegetables, low-fat or nonfat dairy, lean protein, nuts and legumes, whole grains, and heart-healthy oils and fats.  This information is not intended to replace advice given to you by your health care provider. Make sure you discuss any questions you have with your health care provider.  Document Revised: 02/21/2019 Document Reviewed: 01/25/2019  [employer] Patient Education © 2021 [employer].       Mediterranean Diet  A Mediterranean diet refers to food and lifestyle choices that are based on the traditions of countries located on the [place]. This way of eating has been shown to help prevent certain conditions and improve outcomes for people who have chronic diseases, like kidney disease and heart disease.  What are tips for following this  plan?  Lifestyle  Cook and eat meals together with your family, when possible.  Drink enough fluid to keep your urine clear or pale yellow.  Be physically active every day. This includes:  Aerobic exercise like running or swimming.  Leisure activities like gardening, walking, or housework.  Get 7-8 hours of sleep each night.  If recommended by your health care provider, drink red wine in moderation. This means 1 glass a day for nonpregnant women and 2 glasses a day for men. A glass of wine equals 5 oz (150 mL).  Reading food labels       Check the serving size of packaged foods. For foods such as rice and pasta, the serving size refers to the amount of cooked product, not dry.  Check the total fat in packaged foods. Avoid foods that have saturated fat or trans fats.  Check the ingredients list for added sugars, such as corn syrup.     Shopping  At the grocery store, buy most of your food from the areas near the walls of the store. This includes:  Fresh fruits and vegetables (produce).  Grains, beans, nuts, and seeds. Some of these may be available in unpackaged forms or large amounts (in bulk).  Fresh seafood.  Poultry and eggs.  Low-fat dairy products.  Buy whole ingredients instead of prepackaged foods.  Buy fresh fruits and vegetables in-season from local farmers markets.  Buy frozen fruits and vegetables in resealable bags.  If you do not have access to quality fresh seafood, buy precooked frozen shrimp or canned fish, such as tuna, salmon, or sardines.  Buy small amounts of raw or cooked vegetables, salads, or olives from the deli or salad bar at your store.  Stock your pantry so you always have certain foods on hand, such as olive oil, canned tuna, canned tomatoes, rice, pasta, and beans.  Cooking  Cook foods with extra-virgin olive oil instead of using butter or other vegetable oils.  Have meat as a side dish, and have vegetables or grains as your main dish. This means having meat in small portions or adding  small amounts of meat to foods like pasta or stew.  Use beans or vegetables instead of meat in common dishes like chili or lasagna.  [place] with different cooking methods. Try roasting or broiling vegetables instead of steaming or sautéeing them.  Add frozen vegetables to soups, stews, pasta, or rice.  Add nuts or seeds for added healthy fat at each meal. You can add these to yogurt, salads, or vegetable dishes.  Marinate fish or vegetables using olive oil, lemon juice, garlic, and fresh herbs.  Meal planning       Plan to eat 1 vegetarian meal one day each week. Try to work up to 2 vegetarian meals, if possible.  Eat seafood 2 or more times a week.  Have healthy snacks readily available, such as:  Vegetable sticks with hummus.  Greek yogurt.  Fruit and nut trail mix.  Eat balanced meals throughout the week. This includes:  Fruit: 2-3 servings a day  Vegetables: 4-5 servings a day  Low-fat dairy: 2 servings a day  Fish, poultry, or lean meat: 1 serving a day  Beans and legumes: 2 or more servings a week  Nuts and seeds: 1-2 servings a day  Whole grains: 6-8 servings a day  Extra-virgin olive oil: 3-4 servings a day  Limit red meat and sweets to only a few servings a month     What are my food choices?  Mediterranean diet  Recommended  Grains: Whole-grain pasta. Brown rice. Bulgar wheat. Polenta. Couscous. Whole-wheat bread. Oatmeal. Quinoa.  Vegetables: Artichokes. Beets. Broccoli. Cabbage. Carrots. Eggplant. Green beans. Chard. Kale. Spinach. Onions. Leeks. Peas. Squash. Tomatoes. Peppers. Radishes.  Fruits: Apples. Apricots. Avocado. Berries. Bananas. Cherries. Dates. Figs. Grapes. Angelia. Melon. Oranges. Peaches. Plums. Pomegranate.  Meats and other protein foods: Beans. Almonds. Sunflower seeds. Pine nuts. Peanuts. Cod. Thompsons. Scallops. Shrimp. Tuna. Tilapia. Clams. Oysters. Eggs.  Dairy: Low-fat milk. Cheese. Greek yogurt.  Beverages: Water. Red wine. Herbal tea.  Fats and oils: Extra virgin olive oil.  Avocado oil. Grape seed oil.  Sweets and desserts: Greek yogurt with honey. Baked apples. Poached pears. Trail mix.  Seasoning and other foods: Basil. Cilantro. Coriander. Cumin. Mint. Parsley. Rui. Rosemary. Tarragon. Garlic. Oregano. Thyme. Pepper. Balsalmic vinegar. Tahini. Hummus. Tomato sauce. Olives. Mushrooms.  Limit these  Grains: Prepackaged pasta or rice dishes. Prepackaged cereal with added sugar.  Vegetables: Deep fried potatoes (french fries).  Fruits: Fruit canned in syrup.  Meats and other protein foods: Beef. Pork. Lamb. Poultry with skin. Hot dogs. Lopez.  Dairy: Ice cream. Sour cream. Whole milk.  Beverages: Juice. Sugar-sweetened soft drinks. Beer. Liquor and spirits.  Fats and oils: Butter. Canola oil. Vegetable oil. Beef fat (tallow). Lard.  Sweets and desserts: Cookies. Cakes. Pies. Candy.  Seasoning and other foods: Mayonnaise. Premade sauces and marinades.  The items listed may not be a complete list. Talk with your dietitian about what dietary choices are right for you.  Summary  The Mediterranean diet includes both food and lifestyle choices.  Eat a variety of fresh fruits and vegetables, beans, nuts, seeds, and whole grains.  Limit the amount of red meat and sweets that you eat.  Talk with your health care provider about whether it is safe for you to drink red wine in moderation. This means 1 glass a day for nonpregnant women and 2 glasses a day for men. A glass of wine equals 5 oz (150 mL).  This information is not intended to replace advice given to you by your health care provider. Make sure you discuss any questions you have with your health care provider.  Document Revised: 08/17/2017 Document Reviewed: 08/10/2017  Elsevier Patient Education © 2020 [employer]         Exercising to Stay Healthy  To become healthy and stay healthy, it is recommended that you do moderate-intensity and vigorous-intensity exercise. You can tell that you are exercising at a moderate intensity if your  heart starts beating faster and you start breathing faster but can still hold a conversation. You can tell that you are exercising at a vigorous intensity if you are breathing much harder and faster and cannot hold a conversation while exercising.  Exercising regularly is important. It has many health benefits, such as:  Improving overall fitness, flexibility, and endurance.  Increasing bone density.  Helping with weight control.  Decreasing body fat.  Increasing muscle strength.  Reducing stress and tension.  Improving overall health.  How often should I exercise?  Choose an activity that you enjoy, and set realistic goals. Your health care provider can help you make an activity plan that works for you.  Exercise regularly as told by your health care provider. This may include:  Doing strength training two times a week, such as:  Lifting weights.  Using resistance bands.  Push-ups.  Sit-ups.  Yoga.  Doing a certain intensity of exercise for a given amount of time. Choose from these options:  A total of 150 minutes of moderate-intensity exercise every week.  A total of 75 minutes of vigorous-intensity exercise every week.  A mix of moderate-intensity and vigorous-intensity exercise every week.  Children, pregnant women, people who have not exercised regularly, people who are overweight, and older adults may need to talk with a health care provider about what activities are safe to do. If you have a medical condition, be sure to talk with your health care provider before you start a new exercise program.  What are some exercise ideas?    Moderate-intensity exercise ideas include:  Walking 1 mile (1.6 km) in about 15 minutes.  Biking.  Hiking.  Golfing.  Dancing.  Water aerobics.  Vigorous-intensity exercise ideas include:  Walking 4.5 miles (7.2 km) or more in about 1 hour.  Jogging or running 5 miles (8 km) in about 1 hour.  Biking 10 miles (16.1 km) or more in about 1 hour.  Lap swimming.  Roller-skating or in-line  skating.  Cross-country skiing.  Vigorous competitive sports, such as football, basketball, and soccer.  Jumping rope.  Aerobic dancing.  What are some everyday activities that can help me to get exercise?  Yard work, such as:  Pushing a .  Raking and bagging leaves.  Washing your car.  Pushing a stroller.  Shoveling snow.  Gardening.  Washing windows or floors.  How can I be more active in my day-to-day activities?  Use stairs instead of an elevator.  Take a walk during your lunch break.  If you drive, park your car farther away from your work or school.  If you take public transportation, get off one stop early and walk the rest of the way.  Stand up or walk around during all of your indoor phone calls.  Get up, stretch, and walk around every 30 minutes throughout the day.  Enjoy exercise with a friend. Support to continue exercising will help you keep a regular routine of activity.  What guidelines can I follow while exercising?  Before you start a new exercise program, talk with your health care provider.  Do not exercise so much that you hurt yourself, feel dizzy, or get very short of breath.  Wear comfortable clothes and wear shoes with good support.  Drink plenty of water while you exercise to prevent dehydration or heat stroke.  Work out until your breathing and your heartbeat get faster.  Where to find more information  [place] Department of Health and Human Services: www.hhs.gov  Centers for Disease Control and Prevention (CDC): www.cdc.gov  Summary  Exercising regularly is important. It will improve your overall fitness, flexibility, and endurance.  Regular exercise also will improve your overall health. It can help you control your weight, reduce stress, and improve your bone density.  Do not exercise so much that you hurt yourself, feel dizzy, or get very short of breath.  Before you start a new exercise program, talk with your health care provider.  This information is not intended to replace  advice given to you by your health care provider. Make sure you discuss any questions you have with your health care provider.  Document Revised: 11/30/2018 Document Reviewed: 11/08/2018  Elsevier Patient Education © 2021 [employer]           Mindfulness-Based Stress Reduction  Mindfulness-based stress reduction (MBSR) is a program that helps people learn to practice mindfulness. Mindfulness is the practice of intentionally paying attention to the present moment. It can be learned and practiced through techniques such as education, breathing exercises, meditation, and yoga. MBSR includes several mindfulness techniques in one program.  MBSR works best when you understand the treatment, are willing to try new things, and can commit to spending time practicing what you learn. MBSR training may include learning about:  How your emotions, thoughts, and reactions affect your body.  New ways to respond to things that cause negative thoughts to start (triggers).  How to notice your thoughts and let go of them.  Practicing awareness of everyday things that you normally do without thinking.  The techniques and goals of different types of meditation.  What are the benefits of MBSR?  MBSR can have many benefits, which include helping you to:  Develop self-awareness. This refers to knowing and understanding yourself.  Learn skills and attitudes that help you to participate in your own health care.  Learn new ways to care for yourself.  Be more accepting about how things are, and let things go.  Be less judgmental and approach things with an open mind.  Be patient with yourself and trust yourself more.  MBSR has also been shown to:  Reduce negative emotions, such as depression and anxiety.  Improve memory and focus.  Change how you sense and approach pain.  Boost your body's ability to fight infections.  Help you connect better with other people.  Improve your sense of well-being.  Follow these instructions at home:       Find  a local in-person or online MBSR program.  Set aside some time regularly for mindfulness practice.  Find a mindfulness practice that works best for you. This may include one or more of the following:  Meditation. Meditation involves focusing your mind on a certain thought or activity.  Breathing awareness exercises. These help you to stay present by focusing on your breath.  Body scan. For this practice, you lie down and pay attention to each part of your body from head to toe. You can identify tension and soreness and intentionally relax parts of your body.  Yoga. Yoga involves stretching and breathing, and it can improve your ability to move and be flexible. It can also provide an experience of testing your body's limits, which can help you release stress.  Mindful eating. This way of eating involves focusing on the taste, texture, color, and smell of each bite of food. Because this slows down eating and helps you feel full sooner, it can be an important part of a weight-loss plan.  Find a podcast or recording that provides guidance for breathing awareness, body scan, or meditation exercises. You can listen to these any time when you have a free moment to rest without distractions.  Follow your treatment plan as told by your health care provider. This may include taking regular medicines and making changes to your diet or lifestyle as recommended.  How to practice mindfulness  To do a basic awareness exercise:  Find a comfortable place to sit.  Pay attention to the present moment. Observe your thoughts, feelings, and surroundings just as they are.  Avoid placing judgment on yourself, your feelings, or your surroundings. Make note of any judgment that comes up, and let it go.  Your mind may wander, and that is okay. Make note of when your thoughts drift, and return your attention to the present moment.  To do basic mindfulness meditation:  Find a comfortable place to sit. This may include a stable chair or a firm  floor cushion.  Sit upright with your back straight. Let your arms fall next to your side with your hands resting on your legs.  If sitting in a chair, rest your feet flat on the floor.  If sitting on a cushion, cross your legs in front of you.  Keep your head in a neutral position with your chin dropped slightly. Relax your jaw and rest the tip of your tongue on the roof of your mouth. Drop your gaze to the floor. You can close your eyes if you like.  Breathe normally and pay attention to your breath. Feel the air moving in and out of your nose. Feel your belly expanding and relaxing with each breath.  Your mind may wander, and that is okay. Make note of when your thoughts drift, and return your attention to your breath.  Avoid placing judgment on yourself, your feelings, or your surroundings. Make note of any judgment or feelings that come up, let them go, and bring your attention back to your breath.  When you are ready, lift your gaze or open your eyes. Pay attention to how your body feels after the meditation.  Where to find more information  You can find more information about MBSR from:  Your health care provider.  Community-based meditation centers or programs.  Programs offered near you.  Summary  Mindfulness-based stress reduction (MBSR) is a program that teaches you how to intentionally pay attention to the present moment. It is used with other treatments to help you cope better with daily stress, emotions, and pain.  MBSR focuses on developing self-awareness, which allows you to respond to life stress without judgment or negative emotions.  MBSR programs may involve learning different mindfulness practices, such as breathing exercises, meditation, yoga, body scan, or mindful eating. Find a mindfulness practice that works best for you, and set aside time for it on a regular basis.  This information is not intended to replace advice given to you by your health care provider. Make sure you discuss any  questions you have with your health care provider.  Document Revised: 02/22/2021 Document Reviewed: 04/26/2018  Elsevier Patient Education © 2021 [employer] Inc.

## 2024-02-23 NOTE — PROGRESS NOTES
The ABCs of the Annual Wellness Visit  Subsequent Medicare Wellness Visit    Subjective      Kahlil Elliott is a 58 y.o. female who presents for a Subsequent Medicare Wellness Visit.    The following portions of the patient's history were reviewed and   updated as appropriate: allergies, current medications, past family history, past medical history, past social history, past surgical history, and problem list.    Compared to one year ago, the patient feels her physical   health is the same.    Compared to one year ago, the patient feels her mental   health is the same.    Recent Hospitalizations:  She was not admitted to the hospital during the last year.       Current Medical Providers:  Patient Care Team:  Jarad Landeros MD as PCP - General (Internal Medicine)    Outpatient Medications Prior to Visit   Medication Sig Dispense Refill    cetirizine (zyrTEC) 10 MG tablet cetirizine 10 mg tablet      Cholecalciferol 100 MCG (4000 UT) capsule Take 4,000 Int'l Units by mouth Daily. 90 capsule 2    diphenhydrAMINE (BENADRYL) 2 % cream Apply 1 application topically to the appropriate area as directed 3 (Three) Times a Day As Needed for Itching. 56.8 g 2    ezetimibe (ZETIA) 10 MG tablet Take 1 tablet by mouth Daily. 90 tablet 3    fluticasone (FLONASE) 50 MCG/ACT nasal spray 2 sprays into the nostril(s) as directed by provider 2 (Two) Times a Day As Needed for Rhinitis or Allergies. 18.2 mL 1    hydrOXYzine (ATARAX) 25 MG tablet hydroxyzine HCl 25 mg tablet      lisinopril (PRINIVIL,ZESTRIL) 20 MG tablet TAKE 1 TABLET BY MOUTH DAILY 90 tablet 2    omalizumab (Xolair) 150 MG injection Xolair 150 mg subcutaneous solution      tiZANidine (ZANAFLEX) 4 MG tablet Take 1 tablet by mouth 3 (Three) Times a Day. (Patient not taking: Reported on 1/10/2024)       No facility-administered medications prior to visit.       No opioid medication identified on active medication list. I have reviewed chart for other potential  high  "risk medication/s and harmful drug interactions in the elderly.        Aspirin is not on active medication list.  Aspirin use is not indicated based on review of current medical condition/s. Risk of harm outweighs potential benefits.  .    Patient Active Problem List   Diagnosis    Arthralgia of left elbow    Arthritis    Bilateral ankle joint pain    Carpal tunnel syndrome of left wrist    Degeneration of lumbar intervertebral disc    Foot pain    Essential hypertension    Seasonal allergic rhinitis    Shoulder joint pain    History of breast cancer    Overweight (BMI 25.0-29.9)    Mixed hyperlipidemia    Prediabetes    Chronic urticaria    Allergic rhinitis     Advance Care Planning   Advance Care Planning     Advance Directive is not on file.  ACP discussion was held with the patient during this visit. Patient does not have an advance directive, information provided.     Objective    There were no vitals filed for this visit.  Estimated body mass index is 26.16 kg/m² as calculated from the following:    Height as of 1/10/24: 162.6 cm (64\").    Weight as of 1/10/24: 69.1 kg (152 lb 6.4 oz).    BMI is >= 25 and <30. (Overweight) The following options were offered after discussion;: weight loss educational material (shared in after visit summary) and exercise counseling/recommendations      Does the patient have evidence of cognitive impairment?   No            HEALTH RISK ASSESSMENT    Smoking Status:  Social History     Tobacco Use   Smoking Status Never    Passive exposure: Never   Smokeless Tobacco Never     Alcohol Consumption:  Social History     Substance and Sexual Activity   Alcohol Use Yes     Fall Risk Screen:    STEADI Fall Risk Assessment has not been completed.    Depression Screenin/10/2024     2:21 PM   PHQ-2/PHQ-9 Depression Screening   Little Interest or Pleasure in Doing Things 0-->not at all   Feeling Down, Depressed or Hopeless 0-->not at all   PHQ-9: Brief Depression Severity Measure " Score 0       Health Habits and Functional and Cognitive Screening:       No data to display                Age-appropriate Screening Schedule:  Refer to the list below for future screening recommendations based on patient's age, sex and/or medical conditions. Orders for these recommended tests are listed in the plan section. The patient has been provided with a written plan.    Health Maintenance   Topic Date Due    COVID-19 Vaccine (1) Never done    ZOSTER VACCINE (1 of 2) Never done    ANNUAL WELLNESS VISIT  Never done    PAP SMEAR  Never done    RSV Vaccine - Adults (1 - 1-dose 60+ series) Never done    INFLUENZA VACCINE  08/01/2023    LIPID PANEL  11/10/2023    BMI FOLLOWUP  12/08/2023    COLORECTAL CANCER SCREENING  06/01/2024    MAMMOGRAM  01/15/2026    TDAP/TD VACCINES (2 - Td or Tdap) 05/04/2032    HEPATITIS C SCREENING  Completed    Pneumococcal Vaccine 0-64  Aged Out                  CMS Preventative Services Quick Reference  Risk Factors Identified During Encounter:    Chronic Pain:  back pain stable.  Follows with pain management.  Gets injections, with plan for ablation  Immunizations Discussed/Encouraged: Influenza, Shingrix, and RSV (Respiratory Syncytial Virus)  Inactivity/Sedentary: Patient was advised to exercise at least 150 minutes a week per CDC recommendations.    The above risks/problems have been discussed with the patient.  Pertinent information has been shared with the patient in the After Visit Summary.    Diagnoses and all orders for this visit:    1. Medicare annual wellness visit, subsequent (Primary)    2. Essential hypertension    3. Hyperlipidemia, unspecified hyperlipidemia type    4. Prediabetes    5. Seasonal allergic rhinitis due to pollen    6. Chronic urticaria    7. Overweight (BMI 25.0-29.9)    8. History of breast cancer    9. Arthritis    10. Degeneration of lumbar intervertebral disc      Health Maintenance:  -nutritional counseling on the components of a heart healthy  diet  -exercise counseling, recommending at least 2.5 hours of moderate exercise weekly      Follow Up:   Next Medicare Wellness visit to be scheduled in 1 year.      An After Visit Summary and PPPS were made available to the patient.

## 2024-02-23 NOTE — PROGRESS NOTES
Chief Complaint  Annual Exam  Medicare Wellness, HTN    Subjective          Kahlil Elliott presents to [facility]  History of Present Illness    Swelling in legs has improved quite a bit on new blood pressure medicine.    [facility].    Follows with pain management for back pain.  Gets injections, and there is a plan for ablation.      PHQ-9 Depression Screening  Little interest or pleasure in doing things? 0-->not at all   Feeling down, depressed, or hopeless? 0-->not at all   Trouble falling or staying asleep, or sleeping too much?     Feeling tired or having little energy?     Poor appetite or overeating?     Feeling bad about yourself - or that you are a failure or have let yourself or your family down?     Trouble concentrating on things, such as reading the newspaper or watching television?     Moving or speaking so slowly that other people could have noticed? Or the opposite - being so fidgety or restless that you have been moving around a lot more than usual?     Thoughts that you would be better off dead, or of hurting yourself in some way?     PHQ-9 Total Score 0   If you checked off any problems, how difficult have these problems made it for you to do your work, take care of things at home, or get along with other people?           Current Outpatient Medications   Medication Instructions    cetirizine (zyrTEC) 10 MG tablet cetirizine 10 mg tablet    Cholecalciferol 100 MCG (4000 UT) capsule 4,000 Int'l Units, Oral, Daily    diphenhydrAMINE (BENADRYL) 2 % cream 1 application , Topical, 3 Times Daily PRN    ezetimibe (ZETIA) 10 mg, Oral, Daily    fluticasone (FLONASE) 50 MCG/ACT nasal spray 2 sprays, Nasal, 2 Times Daily PRN    hydrOXYzine (ATARAX) 25 MG tablet hydroxyzine HCl 25 mg tablet    lisinopril (PRINIVIL,ZESTRIL) 20 mg, Oral, Daily    omalizumab (Xolair) 150 MG injection Xolair 150 mg subcutaneous solution     "tiZANidine (ZANAFLEX) 4 MG tablet 1 tablet, 3 Times Daily       The following portions of the patient's history were reviewed and updated as appropriate: allergies, current medications, past family history, past medical history, past social history, past surgical history, and problem list.    Objective   Vital Signs:   /80 (BP Location: Left arm, Patient Position: Sitting, Cuff Size: Adult)   Pulse 78   Temp 98.6 °F (37 °C) (Temporal)   Ht 162.6 cm (64.02\")   Wt 67.3 kg (148 lb 4.8 oz)   SpO2 96%   BMI 25.44 kg/m²     BP Readings from Last 3 Encounters:   02/23/24 114/80   01/10/24 117/79   07/18/23 131/91     Wt Readings from Last 3 Encounters:   02/23/24 67.3 kg (148 lb 4.8 oz)   01/10/24 69.1 kg (152 lb 6.4 oz)   07/18/23 67.1 kg (148 lb)           Physical Exam  Vitals reviewed.   Constitutional:       General: She is not in acute distress.     Appearance: Normal appearance. She is not ill-appearing, toxic-appearing or diaphoretic.   HENT:      Head: Normocephalic and atraumatic.      Right Ear: External ear normal.      Left Ear: External ear normal.   Eyes:      Conjunctiva/sclera: Conjunctivae normal.   Cardiovascular:      Rate and Rhythm: Normal rate and regular rhythm.      Pulses: Normal pulses.      Heart sounds: Normal heart sounds. No murmur heard.     No friction rub. No gallop.   Pulmonary:      Effort: Pulmonary effort is normal. No respiratory distress.      Breath sounds: Normal breath sounds. No stridor. No wheezing, rhonchi or rales.   Chest:      Chest wall: No tenderness.   Abdominal:      General: Abdomen is flat.      Palpations: Abdomen is soft. There is no mass.      Tenderness: There is no abdominal tenderness.   Musculoskeletal:      Right lower leg: No edema.      Left lower leg: No edema.   Skin:     General: Skin is warm and dry.   Neurological:      General: No focal deficit present.      Mental Status: She is alert. Mental status is at baseline.   Psychiatric:         " "Mood and Affect: Mood normal.         Behavior: Behavior normal.         Thought Content: Thought content normal.         Judgment: Judgment normal.          Result Review :   The following data was reviewed by: Jarad Landeros MD on 02/23/2024:  Common labs          5/10/2023    10:37   Common Labs   Glucose 91    BUN 9    Creatinine 0.65    Sodium 141    Potassium 3.9    Chloride 104    Calcium 9.8    Albumin 4.5    Total Bilirubin 0.6    Alkaline Phosphatase 128    AST (SGOT) 19    ALT (SGPT) 11    Hemoglobin A1C 5.80             No results found for: \"SARSANTIGEN\", \"COVID19\", \"RAPFLUA\", \"RAPFLUB\", \"FLUAAG\", \"FLUABDAG\", \"FLU\", \"FLUBAG\", \"RAPSCRN\", \"STREPAAG\", \"RSV\", \"POCPREGUR\", \"MONOSPOT\", \"INR\", \"LEADCAPBLD\", \"POCLEAD\", \"BILIRUBINUR\"    Procedures        Assessment and Plan    Diagnoses and all orders for this visit:    1. Medicare annual wellness visit, subsequent (Primary)  -     CBC & Differential  -     Comprehensive Metabolic Panel  -     Hemoglobin A1c  -     Lipid Panel  -     TSH    2. Essential hypertension  -     Comprehensive Metabolic Panel    3. Hyperlipidemia, unspecified hyperlipidemia type  -     Lipid Panel    4. Prediabetes  -     Comprehensive Metabolic Panel  -     Hemoglobin A1c    5. Seasonal allergic rhinitis due to pollen  -     Ambulatory Referral to Allergy    6. Chronic urticaria  -     Ambulatory Referral to Allergy    7. Overweight (BMI 25.0-29.9)    8. History of breast cancer    9. Arthritis    10. Degeneration of lumbar intervertebral disc    11. Allergic rhinitis, unspecified seasonality, unspecified trigger  -     Ambulatory Referral to Allergy    12. Cervical cancer screening  -     Ambulatory Referral to Obstetrics / Gynecology      HTN:  -BP at goal of < 130/80  -will continue lisinioprl    LBP:  -stable  -follows with pain management        There are no discontinued medications.       Follow Up   Return in about 6 months (around 8/23/2024) for Hypertension.  Patient was " given instructions and counseling regarding her condition or for health maintenance advice. Please see specific information pulled into the AVS if appropriate.       Jarad Landeros MD  02/23/24  16:42 EST

## 2024-04-04 ENCOUNTER — PREP FOR SURGERY (OUTPATIENT)
Dept: OTHER | Facility: HOSPITAL | Age: 59
End: 2024-04-04
Payer: MEDICARE

## 2024-04-04 ENCOUNTER — CLINICAL SUPPORT (OUTPATIENT)
Dept: GASTROENTEROLOGY | Facility: CLINIC | Age: 59
End: 2024-04-04
Payer: MEDICARE

## 2024-04-04 DIAGNOSIS — Z86.010 HISTORY OF COLON POLYPS: Primary | ICD-10-CM

## 2024-04-04 DIAGNOSIS — K63.5 POLYP OF COLON, UNSPECIFIED PART OF COLON, UNSPECIFIED TYPE: Primary | ICD-10-CM

## 2024-04-04 RX ORDER — SODIUM, POTASSIUM,MAG SULFATES 17.5-3.13G
1 SOLUTION, RECONSTITUTED, ORAL ORAL EVERY 12 HOURS
Qty: 354 ML | Refills: 0 | Status: SHIPPED | OUTPATIENT
Start: 2024-04-04

## 2024-04-04 NOTE — PROGRESS NOTES
Kahlil Elliott  1965  59 y.o.    Reason for call: Recall  Prep prescribed: Suprep pt requested. Pt is aware insurance does not cover. Pt states she will pay out of pocket.   Prep instructions reviewed with patient and sent to patient via regular mail to the home address on file  Is the patient currently on any injectable medications for weight loss or diabetes? No  Clearance needed? No  If yes, what clearance is needed? N/A  Clearance has been requested from n/a   The patient has been scheduled for: Colonoscopy  After your procedure, you will be contacted with results. Please confirm the best phone # to reach the patient: [phone]  Family history of colon cancer? No  If yes, indicate relative: n/a   Family History   Problem Relation Age of Onset    Hypertension Mother     Hypertension Sister      No past medical history on file.  No Known Allergies  Past Surgical History:   Procedure Laterality Date    BACK SURGERY  01/04/1999    lumbar 3 and 4    BILATERAL BREAST REDUCTION Bilateral 05/01/2017     Social History     Socioeconomic History    Marital status:    Tobacco Use    Smoking status: Never     Passive exposure: Never    Smokeless tobacco: Never   Vaping Use    Vaping status: Never Used   Substance and Sexual Activity    Alcohol use: Yes    Drug use: Never    Sexual activity: Defer       Current Outpatient Medications:     cetirizine (zyrTEC) 10 MG tablet, cetirizine 10 mg tablet, Disp: , Rfl:     Cholecalciferol 100 MCG (4000 UT) capsule, Take 4,000 Int'l Units by mouth Daily., Disp: 90 capsule, Rfl: 2    diphenhydrAMINE (BENADRYL) 2 % cream, Apply 1 application topically to the appropriate area as directed 3 (Three) Times a Day As Needed for Itching., Disp: 56.8 g, Rfl: 2    ezetimibe (ZETIA) 10 MG tablet, Take 1 tablet by mouth Daily., Disp: 90 tablet, Rfl: 3    fluticasone (FLONASE) 50 MCG/ACT nasal spray, 2 sprays into the nostril(s) as directed by provider 2 (Two) Times a Day As Needed  for Rhinitis or Allergies., Disp: 18.2 mL, Rfl: 1    hydrOXYzine (ATARAX) 25 MG tablet, hydroxyzine HCl 25 mg tablet, Disp: , Rfl:     lisinopril (PRINIVIL,ZESTRIL) 20 MG tablet, TAKE 1 TABLET BY MOUTH DAILY, Disp: 90 tablet, Rfl: 2    omalizumab (Xolair) 150 MG injection, Xolair 150 mg subcutaneous solution, Disp: , Rfl:     tiZANidine (ZANAFLEX) 4 MG tablet, Take 1 tablet by mouth 3 (Three) Times a Day. (Patient not taking: Reported on 1/10/2024), Disp: , Rfl:

## 2024-05-06 ENCOUNTER — ANESTHESIA EVENT (OUTPATIENT)
Dept: GASTROENTEROLOGY | Facility: HOSPITAL | Age: 59
End: 2024-05-06
Payer: MEDICARE

## 2024-05-07 ENCOUNTER — HOSPITAL ENCOUNTER (OUTPATIENT)
Facility: HOSPITAL | Age: 59
Setting detail: HOSPITAL OUTPATIENT SURGERY
Discharge: HOME | End: 2024-05-07
Attending: INTERNAL MEDICINE | Admitting: INTERNAL MEDICINE
Payer: MEDICARE

## 2024-05-07 ENCOUNTER — ANESTHESIA (OUTPATIENT)
Dept: GASTROENTEROLOGY | Facility: HOSPITAL | Age: 59
End: 2024-05-07
Payer: MEDICARE

## 2024-05-07 VITALS
BODY MASS INDEX: 24.36 KG/M2 | DIASTOLIC BLOOD PRESSURE: 63 MMHG | OXYGEN SATURATION: 96 % | RESPIRATION RATE: 18 BRPM | WEIGHT: 141.98 LBS | SYSTOLIC BLOOD PRESSURE: 108 MMHG | TEMPERATURE: 97 F | HEART RATE: 61 BPM

## 2024-05-07 DIAGNOSIS — Z86.010 HISTORY OF COLON POLYPS: ICD-10-CM

## 2024-05-07 PROCEDURE — 25010000002 PROPOFOL 500 MG/50ML EMULSION: Performed by: NURSE ANESTHETIST, CERTIFIED REGISTERED

## 2024-05-07 PROCEDURE — 25010000002 PROPOFOL 10 MG/ML EMULSION: Performed by: NURSE ANESTHETIST, CERTIFIED REGISTERED

## 2024-05-07 PROCEDURE — 88305 TISSUE EXAM BY PATHOLOGIST: CPT | Performed by: INTERNAL MEDICINE

## 2024-05-07 PROCEDURE — 25810000003 LACTATED RINGERS PER 1000 ML

## 2024-05-07 RX ORDER — PROPOFOL 10 MG/ML
INJECTION, EMULSION INTRAVENOUS AS NEEDED
Status: DISCONTINUED | OUTPATIENT
Start: 2024-05-07 | End: 2024-05-07 | Stop reason: SURG

## 2024-05-07 RX ORDER — SODIUM CHLORIDE, SODIUM LACTATE, POTASSIUM CHLORIDE, CALCIUM CHLORIDE 600; 310; 30; 20 MG/100ML; MG/100ML; MG/100ML; MG/100ML
30 INJECTION, SOLUTION INTRAVENOUS CONTINUOUS
Status: DISCONTINUED | OUTPATIENT
Start: 2024-05-07 | End: 2024-05-07 | Stop reason: HOSPADM

## 2024-05-07 RX ORDER — LIDOCAINE HYDROCHLORIDE 20 MG/ML
INJECTION, SOLUTION EPIDURAL; INFILTRATION; INTRACAUDAL; PERINEURAL AS NEEDED
Status: DISCONTINUED | OUTPATIENT
Start: 2024-05-07 | End: 2024-05-07 | Stop reason: SURG

## 2024-05-07 RX ORDER — PROPOFOL 10 MG/ML
VIAL (ML) INTRAVENOUS CONTINUOUS PRN
Status: DISCONTINUED | OUTPATIENT
Start: 2024-05-07 | End: 2024-05-07 | Stop reason: SURG

## 2024-05-07 RX ADMIN — SODIUM CHLORIDE, POTASSIUM CHLORIDE, SODIUM LACTATE AND CALCIUM CHLORIDE 30 ML/HR: 600; 310; 30; 20 INJECTION, SOLUTION INTRAVENOUS at 06:57

## 2024-05-07 RX ADMIN — PROPOFOL 200 MCG/KG/MIN: 10 INJECTION, EMULSION INTRAVENOUS at 08:13

## 2024-05-07 RX ADMIN — LIDOCAINE HYDROCHLORIDE 30 MG: 20 INJECTION, SOLUTION EPIDURAL; INFILTRATION; INTRACAUDAL; PERINEURAL at 08:13

## 2024-05-07 RX ADMIN — PROPOFOL 70 MG: 10 INJECTION, EMULSION INTRAVENOUS at 08:13

## 2024-05-08 LAB
CYTO UR: NORMAL
LAB AP CASE REPORT: NORMAL
LAB AP CLINICAL INFORMATION: NORMAL
PATH REPORT.FINAL DX SPEC: NORMAL
PATH REPORT.GROSS SPEC: NORMAL

## 2024-05-09 ENCOUNTER — TELEPHONE (OUTPATIENT)
Dept: GASTROENTEROLOGY | Facility: CLINIC | Age: 59
End: 2024-05-09
Payer: MEDICARE

## 2024-06-10 NOTE — PROGRESS NOTES
"Well Woman Visit    CC: Scheduled annual well gyn visit  Chief Complaint   Patient presents with    Annual Exam       Myriad intake in the past?: No        Post menopausal, using no HRT    HPI:   59 y.o.   PCP: does manage PMHx and preventative labs  History: PMHx, Meds, Allergies, PSHx, Social Hx, and POBHx all reviewed and updated.    Pt has no complaints today.    PHYSICAL EXAM:  /78   Pulse 87   Ht 162.6 cm (64.02\")   Wt 63.5 kg (140 lb)   LMP  (LMP Unknown) Comment: no periods with ablation  BMI 24.02 kg/m²  Not found.  General- NAD, alert and oriented, appropriate  Psych- Normal mood, good memory  Neck- No masses, no thyroid enlargement  CV- Regular rhythm, no murnurs  Resp- CTA to bases, no wheezes  Abdomen- Soft, non distended, non tender, no masses    Breast left-  Bilaterally symmetrical, no masses, non tender, no nipple discharge  Breast right- Bilaterally symmetrical, no masses, non tender, no nipple discharge    External genitalia- Normal female, no lesions  Urethra/meatus- Normal, no masses, non tender  Bladder- Normal, no masses, non tender  Vagina- Normal, moderate atrophy, no lesions, no discharge.    Cvx- Normal, no lesions, no discharge, No cervical motion tenderness  Uterus- Normal size, shape & consistency.  Non tender, mobile.  Adnexa- No mass, non tender  Anus/Rectum/Perineum- Not performed    Lymphatic- No palpable neck, axillary, or groin nodes  Ext- No edema, no cyanosis    Skin- No lesions, no rashes, no acanthosis nigricans      ASSESSMENT and PLAN:    Diagnoses and all orders for this visit:    1. Well woman exam with routine gynecological exam (Primary)  -     IgP, Aptima HPV        Preventative:  BREAST HEALTH- Monthly self breast exam importance and how to reviewed. MMG and/or MRI (prn) reviewed per society guidelines and her individual history. Screen: Already up to date  CERVICAL CANCER Screening- Reviewed current ASCCP guidelines for screening w and wo cotest HPV, " age specific.  Screen: Updated today  COLON CANCER Screening- Reviewed current medical society guidelines and options.  Screen:  Already up to date  Follow up PCP/Specialist PMHx and/or Labs      She understands the importance of having any ordered tests to be performed in a timely fashion.  The risks of not performing them include, but are not limited to, advanced cancer stages, bone loss from osteoporosis and/or subsequent increase in morbidity and/or mortality.  She is encouraged to review her results online and/or contact or office if she has questions.     Follow Up:  Return in about 1 year (around 6/12/2025) for Annual physical.            Sonia Montez MD  06/12/2024    [facility]  [facility] [place] [address]  Dept: [phone]  Dept Fax: [phone]  Loc: [phone]  Loc Fax: [phone]

## 2024-06-12 ENCOUNTER — OFFICE VISIT (OUTPATIENT)
Dept: OBSTETRICS AND GYNECOLOGY | Facility: CLINIC | Age: 59
End: 2024-06-12
Payer: MEDICARE

## 2024-06-12 VITALS
DIASTOLIC BLOOD PRESSURE: 78 MMHG | SYSTOLIC BLOOD PRESSURE: 118 MMHG | WEIGHT: 140 LBS | HEART RATE: 87 BPM | HEIGHT: 64 IN | BODY MASS INDEX: 23.9 KG/M2

## 2024-06-12 DIAGNOSIS — Z01.419 WELL WOMAN EXAM WITH ROUTINE GYNECOLOGICAL EXAM: Primary | ICD-10-CM

## 2024-06-12 PROCEDURE — G0123 SCREEN CERV/VAG THIN LAYER: HCPCS | Performed by: OBSTETRICS & GYNECOLOGY

## 2024-06-12 PROCEDURE — 87624 HPV HI-RISK TYP POOLED RSLT: CPT | Performed by: OBSTETRICS & GYNECOLOGY

## 2024-06-17 LAB
CYTOLOGIST CVX/VAG CYTO: NORMAL
CYTOLOGY CVX/VAG DOC CYTO: NORMAL
CYTOLOGY CVX/VAG DOC THIN PREP: NORMAL
DX ICD CODE: NORMAL
HPV I/H RISK 4 DNA CVX QL PROBE+SIG AMP: NEGATIVE
Lab: NORMAL
OTHER STN SPEC: NORMAL
STAT OF ADQ CVX/VAG CYTO-IMP: NORMAL

## 2024-08-20 DIAGNOSIS — I10 ESSENTIAL HYPERTENSION: ICD-10-CM

## 2024-08-21 RX ORDER — LISINOPRIL 20 MG/1
20 TABLET ORAL DAILY
Qty: 90 TABLET | Refills: 2 | Status: SHIPPED | OUTPATIENT
Start: 2024-08-21

## (undated) DEVICE — LINER SURG CANSTR SXN S/RIGD 1500CC

## (undated) DEVICE — THE SINGLE USE ETRAP – POLYP TRAP IS USED FOR SUCTION RETRIEVAL OF ENDOSCOPICALLY REMOVED POLYPS.: Brand: ETRAP

## (undated) DEVICE — Device

## (undated) DEVICE — Device: Brand: DEFENDO AIR/WATER/SUCTION AND BIOPSY VALVE

## (undated) DEVICE — SOL IRRG H2O PL/BG 1000ML STRL

## (undated) DEVICE — SOLIDIFIER LIQLOC PLS 1500CC BT

## (undated) DEVICE — CONN JET HYDRA H20 AUXILIARY DISP

## (undated) DEVICE — SNAR POLYP CAPTIFLEX XS/OVL 11X2.4MM 240CM 1P/U